# Patient Record
Sex: FEMALE | Race: WHITE | NOT HISPANIC OR LATINO | Employment: UNEMPLOYED | URBAN - METROPOLITAN AREA
[De-identification: names, ages, dates, MRNs, and addresses within clinical notes are randomized per-mention and may not be internally consistent; named-entity substitution may affect disease eponyms.]

---

## 2017-01-04 ENCOUNTER — TELEPHONE (OUTPATIENT)
Dept: OBSTETRICS AND GYNECOLOGY | Facility: CLINIC | Age: 47
End: 2017-01-04

## 2017-01-04 NOTE — LETTER
January 5, 2017    Fawn Waters  [address] [place] [address]             [facility]  [address] [facility]  5th Floor [facility], [address]  [place] [address]  Phone: [phone] Dear Ms. Waters:    The results of your most recent Pap smear are normal. This means that no cancerous or precancerous cells were seen. We recommend that you come back in 1 year for your annual exam and 3 years for your next Pap smear.    If you have any questions or concerns, please don't hesitate to call.    Sincerely,        Dr. John Mata

## 2017-01-23 ENCOUNTER — TELEPHONE (OUTPATIENT)
Dept: OBSTETRICS AND GYNECOLOGY | Facility: CLINIC | Age: 47
End: 2017-01-23

## 2017-01-23 NOTE — TELEPHONE ENCOUNTER
The detrol la is not working.  Is there any thing else she can take or should she go see another Doctor.  Please advise.

## 2017-01-23 NOTE — TELEPHONE ENCOUNTER
Recommend patient follow up with either Dr. Gillespie or the new female urology physician here in [place].

## 2017-01-23 NOTE — TELEPHONE ENCOUNTER
----- Message from Chuyita Hernandez sent at 1/23/2017 11:40 AM CST -----  Contact: [phone]  Pt would like a nurse call back in regards to the medication she was placed on /Pt states the medicine is not helping her. Please advise.

## 2017-01-24 NOTE — TELEPHONE ENCOUNTER
----- Message from Brittany Figueredo sent at 1/24/2017 10:22 AM CST -----  Contact: Self [phone]  Patient Returning Your Phone Call

## 2017-02-08 ENCOUNTER — HOSPITAL ENCOUNTER (EMERGENCY)
Facility: HOSPITAL | Age: 47
Discharge: HOME | End: 2017-02-08
Attending: EMERGENCY MEDICINE
Payer: MEDICAID

## 2017-02-08 VITALS
WEIGHT: 155 LBS | HEIGHT: 69 IN | BODY MASS INDEX: 22.96 KG/M2 | DIASTOLIC BLOOD PRESSURE: 60 MMHG | HEART RATE: 84 BPM | OXYGEN SATURATION: 100 % | RESPIRATION RATE: 18 BRPM | TEMPERATURE: 98 F | SYSTOLIC BLOOD PRESSURE: 131 MMHG

## 2017-02-08 DIAGNOSIS — R10.2 PELVIC PAIN: ICD-10-CM

## 2017-02-08 LAB
ALBUMIN SERPL BCP-MCNC: 4.1 G/DL
ALP SERPL-CCNC: 65 U/L
ALT SERPL W/O P-5'-P-CCNC: 13 U/L
ANION GAP SERPL CALC-SCNC: 9 MMOL/L
AST SERPL-CCNC: 18 U/L
B-HCG UR QL: NEGATIVE
BASOPHILS # BLD AUTO: 0.02 K/UL
BASOPHILS NFR BLD: 0.3 %
BILIRUB SERPL-MCNC: 0.3 MG/DL
BILIRUB UR QL STRIP: NEGATIVE
BUN SERPL-MCNC: 10 MG/DL
CALCIUM SERPL-MCNC: 9.4 MG/DL
CHLORIDE SERPL-SCNC: 106 MMOL/L
CLARITY UR: CLEAR
CO2 SERPL-SCNC: 21 MMOL/L
COLOR UR: YELLOW
CREAT SERPL-MCNC: 0.9 MG/DL
CTP QC/QA: YES
DIFFERENTIAL METHOD: ABNORMAL
EOSINOPHIL # BLD AUTO: 0.1 K/UL
EOSINOPHIL NFR BLD: 1.2 %
ERYTHROCYTE [DISTWIDTH] IN BLOOD BY AUTOMATED COUNT: 19.2 %
EST. GFR  (AFRICAN AMERICAN): >60 ML/MIN/1.73 M^2
EST. GFR  (NON AFRICAN AMERICAN): >60 ML/MIN/1.73 M^2
GLUCOSE SERPL-MCNC: 90 MG/DL
GLUCOSE UR QL STRIP: NEGATIVE
HCT VFR BLD AUTO: 27.9 %
HGB BLD-MCNC: 7.5 G/DL
HGB UR QL STRIP: NEGATIVE
KETONES UR QL STRIP: NEGATIVE
LEUKOCYTE ESTERASE UR QL STRIP: NEGATIVE
LYMPHOCYTES # BLD AUTO: 1.2 K/UL
LYMPHOCYTES NFR BLD: 15.8 %
MCH RBC QN AUTO: 18.7 PG
MCHC RBC AUTO-ENTMCNC: 26.9 %
MCV RBC AUTO: 69 FL
MONOCYTES # BLD AUTO: 0.3 K/UL
MONOCYTES NFR BLD: 4.2 %
NEUTROPHILS # BLD AUTO: 6.1 K/UL
NEUTROPHILS NFR BLD: 78.4 %
NITRITE UR QL STRIP: NEGATIVE
PH UR STRIP: 8 [PH] (ref 5–8)
PLATELET # BLD AUTO: 257 K/UL
PMV BLD AUTO: 8.9 FL
POTASSIUM SERPL-SCNC: 4.2 MMOL/L
PROT SERPL-MCNC: 7.5 G/DL
PROT UR QL STRIP: NEGATIVE
RBC # BLD AUTO: 4.02 M/UL
SODIUM SERPL-SCNC: 136 MMOL/L
SP GR UR STRIP: 1.01 (ref 1–1.03)
URN SPEC COLLECT METH UR: NORMAL
UROBILINOGEN UR STRIP-ACNC: NEGATIVE EU/DL
WBC # BLD AUTO: 7.8 K/UL

## 2017-02-08 PROCEDURE — 99284 EMERGENCY DEPT VISIT MOD MDM: CPT | Mod: 25

## 2017-02-08 PROCEDURE — 85025 COMPLETE CBC W/AUTO DIFF WBC: CPT

## 2017-02-08 PROCEDURE — 96374 THER/PROPH/DIAG INJ IV PUSH: CPT

## 2017-02-08 PROCEDURE — 81003 URINALYSIS AUTO W/O SCOPE: CPT

## 2017-02-08 PROCEDURE — 63600175 PHARM REV CODE 636 W HCPCS: Performed by: EMERGENCY MEDICINE

## 2017-02-08 PROCEDURE — 81025 URINE PREGNANCY TEST: CPT

## 2017-02-08 PROCEDURE — 80053 COMPREHEN METABOLIC PANEL: CPT

## 2017-02-08 RX ORDER — KETOROLAC TROMETHAMINE 30 MG/ML
30 INJECTION, SOLUTION INTRAMUSCULAR; INTRAVENOUS
Status: COMPLETED | OUTPATIENT
Start: 2017-02-08 | End: 2017-02-08

## 2017-02-08 RX ORDER — FLUCONAZOLE 100 MG/1
100 TABLET ORAL DAILY
COMMUNITY
End: 2017-06-07

## 2017-02-08 RX ORDER — NAPROXEN SODIUM 550 MG/1
550 TABLET ORAL 2 TIMES DAILY PRN
Qty: 15 TABLET | Refills: 0 | Status: SHIPPED | OUTPATIENT
Start: 2017-02-08 | End: 2017-06-07

## 2017-02-08 RX ADMIN — KETOROLAC TROMETHAMINE 30 MG: 30 INJECTION, SOLUTION INTRAMUSCULAR at 10:02

## 2017-02-08 NOTE — DISCHARGE INSTRUCTIONS
Pelvic Pain, Uncertain Cause    Pelvic pain is pain felt in the lowest part of the belly (abdomen) and between the hipbones. The pain may be acute. This means it occurred suddenly and recently. Or the pain may be chronic. This means it has occurred for 6 months or longer.  There are many possible causes of pelvic pain. The pain may be due to a problem in the female reproductive system (pictured here). Or, it may be due to a problem in the digestive, urinary, or musculoskeletal systems.  Based on your visit today, the exact cause of your pelvic pain is not certain. Your condition does not appear to be serious at this time. But it is important for you to keep watching for any new symptoms or worsening of your condition.  General care  Your healthcare provider may advise a number of ways to help manage your pain. These can include:  · Taking over-the-counter pain medicine. Stronger pain medicine may also be prescribed, if needed.  · Applying heat to the pelvic area. Use a heating pad or a hot pack. Taking a hot bath may also help.  · Getting plenty of rest.  · Making certain lifestyle changes. These can include practicing good posture and getting regular exercise. (Studies have shown that these changes help reduce pelvic pain in some women.)  · Seeing a physical therapist or pain specialist. These healthcare providers can discuss other ways to manage pain with you.  Follow-up care  Follow up with your healthcare provider, or as advised.   When to seek medical advice  Call your healthcare provider right away if any of the following occur:  · Fever of 100.4°F or higher, or as directed by your healthcare provider  · Pain worsens or you have sudden, severe pain or new pain  · Nausea, vomiting, sweating, or restlessness  · Dizziness or fainting  · Unusual vaginal discharge  · Abnormal vaginal bleeding (especially bleeding after menopause)  Date Last Reviewed: 6/11/2015  © 3852-3388 [employer]. [address] [place] [address]  [place] [address]. All rights reserved. This information is not intended as a substitute for professional medical care. Always follow your healthcare professional's instructions.

## 2017-02-08 NOTE — ED PROVIDER NOTES
Encounter Date: 2/8/2017       History     Chief Complaint   Patient presents with    Abdominal Pain     c/o generalized abdominal pain that started at about 0730 this morning. Pain is now to center of lower abdomen. Also c/o nausea and diarrhea today     Review of patient's allergies indicates:   Allergen Reactions    Codeine      Pt states she is in recovery and cannot take any narcotic medications     Patient is a 46 y.o. female presenting with the following complaint: abdominal pain. The history is provided by the patient.   Abdominal Pain   The current episode started 2 to 3 hours ago. The onset of the illness was gradual. The abdominal pain is located in the LLQ. The abdominal pain does not radiate. The severity of the abdominal pain is 10/10. The other symptoms of the illness include diarrhea. The other symptoms of the illness do not include fever, vomiting, dysuria or vaginal discharge.   Significant associated medical issues do not include inflammatory bowel disease.     Past Medical History   Diagnosis Date    Asthma     Depression      No past medical history pertinent negatives.  Past Surgical History   Procedure Laterality Date    Tubal ligation       Family History   Problem Relation Age of Onset    Breast cancer Mother      Social History   Substance Use Topics    Smoking status: Current Every Day Smoker     Types: Cigarettes, Vaping with nicotine    Smokeless tobacco: None    Alcohol use No     Review of Systems   Constitutional: Negative for fever.   Gastrointestinal: Positive for abdominal pain and diarrhea. Negative for vomiting.   Genitourinary: Negative for dysuria and vaginal discharge.   All other systems reviewed and are negative.      Physical Exam   Initial Vitals   BP Pulse Resp Temp SpO2   02/08/17 0909 02/08/17 0909 02/08/17 0909 02/08/17 0909 02/08/17 0909   123/58 83 18 98.1 °F (36.7 °C) 100 %     Physical Exam    Nursing note and vitals reviewed.  Constitutional: No distress.    HENT:   Head: Normocephalic and atraumatic.   Eyes: EOM are normal.   Neck: Normal range of motion. Neck supple.   Cardiovascular: Normal rate, regular rhythm and normal heart sounds.   Pulmonary/Chest: Breath sounds normal.   Abdominal: Soft.   Left lower quadrant and suprapubic tenderness without guarding or rebound.  Bowel sounds are normal.   Musculoskeletal: Normal range of motion.   Neurological: She is alert and oriented to person, place, and time.   Skin: Skin is warm and dry.   Psychiatric: Her behavior is normal. Thought content normal.         ED Course   Procedures  Labs Reviewed   CBC W/ AUTO DIFFERENTIAL - Abnormal; Notable for the following:        Result Value    Hemoglobin 7.5 (*)     Hematocrit 27.9 (*)     MCV 69 (*)     MCH 18.7 (*)     MCHC 26.9 (*)     RDW 19.2 (*)     MPV 8.9 (*)     Gran% 78.4 (*)     Lymph% 15.8 (*)     All other components within normal limits   URINALYSIS   COMPREHENSIVE METABOLIC PANEL     Imaging Results         US Pelvis Comp with Transvag NON-OB (xpd) (Final result) Result time:  02/08/17 11:49:16    Procedure changed from US Pelvis Limited Non OB        Final result by Christine Beckham MD (02/08/17 11:49:16)    Impression:      1.  Normal pelvic ultrasound                   Electronically signed by: CHRISTINE BECKHAM MD  Date:     02/08/17  Time:    11:49     Narrative:    Pelvic ultrasound complete with transvaginal, non-OB    Comparison: none    Results: Transabdominal followed by transvaginal ultrasound of the pelvis obtained.  The uterus  measures   7.5 x 3.9 x 3.4 cm .  The endometrium measures 9 mm, which is normal.   The right ovary measures 1.9 x 1.9 x 1.1 cm. Small follicles seen.    The left ovary measures 3.3 x 3.4 x 2.7 cm. Small follicles seen.  Normal flow is seen to the bilateral ovaries.  No free fluid.                      Medical Decision Making:   Clinical Tests:   Lab Tests: Ordered and Reviewed  The following lab test(s) were unremarkable:  CBC, CMP and Urinalysis  Radiological Study: Ordered and Reviewed                   ED Course     Clinical Impression:   The encounter diagnosis was Pelvic pain.          Reese Judge MD  02/08/17 6100

## 2017-02-08 NOTE — ED AVS SNAPSHOT
[facility] [address]  [place] [address]               Fawn Waters   2017  9:27 AM   ED    Description:  Female : 1970   Department:  [facility]           Your Care was Coordinated By:     Provider Role From To    Reese Judge MD Attending Provider 17 0939 --      Reason for Visit     Abdominal Pain           Diagnoses this Visit        Comments    Pelvic pain           ED Disposition     None           To Do List           Follow-up Information     Follow up with Alex Mora MD.    Specialty:  Family Medicine    Contact information:    [address]  [place] [address]  [phone]         These Medications        Disp Refills Start End    naproxen sodium (ANAPROX) 550 MG tablet 15 tablet 0 2017     Take 1 tablet (550 mg total) by mouth 2 (two) times daily as needed (pain). - Oral    Pharmacy: [id number] - [place] [address] [facility] Ph #: [phone]         [facility]     [facility] -  Assistance  Registered nurses in the [facility] provide clinical advisement, health education, appointment booking, and other advisory services.  Call for this free service at [phone].             Medications           Message regarding Medications     Verify the changes and/or additions to your medication regime listed below are the same as discussed with your clinician today.  If any of these changes or additions are incorrect, please notify your healthcare provider.        START taking these NEW medications        Refills    naproxen sodium (ANAPROX) 550 MG tablet 0    Sig: Take 1 tablet (550 mg total) by mouth 2 (two) times daily as needed (pain).    Class: Print    Route: Oral      These medications were administered today        Dose Freq    ketorolac injection 30 mg 30 mg ED 1 Time    Sig: Inject 30  "mg into the vein ED 1 Time.    Class: Normal    Route: Intravenous           Verify that the below list of medications is an accurate representation of the medications you are currently taking.  If none reported, the list may be blank. If incorrect, please contact your healthcare provider. Carry this list with you in case of emergency.           Current Medications     albuterol 90 mcg/actuation inhaler Inhale 1-2 puffs into the lungs every 6 (six) hours as needed for Wheezing or Shortness of Breath.    fluconazole (DIFLUCAN) 100 MG tablet Take 100 mg by mouth once daily.    fluoxetine (PROZAC) 20 MG capsule Take 1 capsule (20 mg total) by mouth once daily.    omeprazole (PRILOSEC) 40 MG capsule Take 1 capsule (40 mg total) by mouth 2 (two) times daily before meals.    tolterodine (DETROL LA) 4 MG 24 hr capsule Take 1 capsule (4 mg total) by mouth once daily.    naproxen sodium (ANAPROX) 550 MG tablet Take 1 tablet (550 mg total) by mouth 2 (two) times daily as needed (pain).           Clinical Reference Information           Your Vitals Were     BP Pulse Temp Resp Height Weight    123/58 (BP Location: Right arm, Patient Position: Sitting) 83 98.1 °F (36.7 °C) (Oral) 18 5' 9" (1.753 m) 70.3 kg (155 lb)    Last Period SpO2 BMI          01/08/2017 100% 22.89 kg/m2        Allergies as of 2/8/2017        Reactions    Codeine     Pt states she is in recovery and cannot take any narcotic medications      Immunizations Administered on Date of Encounter - 2/8/2017     None      ED Micro, Lab, POCT     Start Ordered       Status Ordering Provider    02/08/17 1021 02/08/17 1020  CBC auto differential  STAT      Final result     02/08/17 1021 02/08/17 1020  Comprehensive metabolic panel  STAT      Final result     02/08/17 1021 02/08/17 1020  Urinalysis  STAT      Final result     02/08/17 0000 02/08/17 0933  POCT urine pregnancy     Comments:  This order was created through External Result Entry    Completed       ED Imaging " Orders     Start Ordered       Status Ordering Provider    02/08/17 1059 02/08/17 1055  US Pelvis Comp with Transvag NON-OB (xpd)  1 time imaging      Final result     02/08/17 1056 02/08/17 1055    1 time imaging,   Status:  Canceled      Canceled         Discharge Instructions         Pelvic Pain, Uncertain Cause    Pelvic pain is pain felt in the lowest part of the belly (abdomen) and between the hipbones. The pain may be acute. This means it occurred suddenly and recently. Or the pain may be chronic. This means it has occurred for 6 months or longer.  There are many possible causes of pelvic pain. The pain may be due to a problem in the female reproductive system (pictured here). Or, it may be due to a problem in the digestive, urinary, or musculoskeletal systems.  Based on your visit today, the exact cause of your pelvic pain is not certain. Your condition does not appear to be serious at this time. But it is important for you to keep watching for any new symptoms or worsening of your condition.  General care  Your healthcare provider may advise a number of ways to help manage your pain. These can include:  · Taking over-the-counter pain medicine. Stronger pain medicine may also be prescribed, if needed.  · Applying heat to the pelvic area. Use a heating pad or a hot pack. Taking a hot bath may also help.  · Getting plenty of rest.  · Making certain lifestyle changes. These can include practicing good posture and getting regular exercise. (Studies have shown that these changes help reduce pelvic pain in some women.)  · Seeing a physical therapist or pain specialist. These healthcare providers can discuss other ways to manage pain with you.  Follow-up care  Follow up with your healthcare provider, or as advised.   When to seek medical advice  Call your healthcare provider right away if any of the following occur:  · Fever of 100.4°F or higher, or as directed by your healthcare provider  · Pain worsens or you  have sudden, severe pain or new pain  · Nausea, vomiting, sweating, or restlessness  · Dizziness or fainting  · Unusual vaginal discharge  · Abnormal vaginal bleeding (especially bleeding after menopause)  Date Last Reviewed: 6/11/2015  © 0888-6231 [employer]. [address] [place] [address], [place] [address]. All rights reserved. This information is not intended as a substitute for professional medical care. Always follow your healthcare professional's instructions.          [facility] Sign-Up     Activating your [facility] account is as easy as 1-2-3!     1) Visit [email or web address], select Sign Up Now, enter this activation code and your date of birth, then select Next.  [email or web address]  Expires: 3/25/2017 12:26 PM      2) Create a username and password to use when you visit [facility] in the future and select a security question in case you lose your password and select Next.    3) Enter your e-mail address and click Sign Up!    Additional Information  If you have questions, please e-mail [email or web address] or call [phone] to talk to our [facility] staff. Remember, [facility] is NOT to be used for urgent needs. For medical emergencies, dial 911.         Smoking Cessation     If you would like to quit smoking:   You may be eligible for free services if you are a [place] resident and started smoking cigarettes before September 1, 1988.  Call the Smoking Cessation Trust ([facility]) toll free at (710) [phone] or (808) 087-6167.   Call 7-732-QUIT-NOW if you do not meet the above criteria.             [facility] complies with applicable Federal civil rights laws and does not discriminate on the basis of race, color, national origin, age, disability, or sex.        Language Assistance Services     ATTENTION: Language assistance services are available, free of charge. Please call [phone].      ATENCIÓN: Si habla español, tiene a glover disposición servicios gratuitos de asistencia  lingüística. [facility] [phone].     NAJMA Ý: N?u b?n nói Ti?ng Vi?t, có các d?ch v? h? tr? ngôn ng? mi?n phí dành cho b?n. G?i s? [phone].

## 2017-06-07 ENCOUNTER — OFFICE VISIT (OUTPATIENT)
Dept: FAMILY MEDICINE | Facility: HOSPITAL | Age: 47
End: 2017-06-07
Attending: FAMILY MEDICINE
Payer: MEDICAID

## 2017-06-07 VITALS
SYSTOLIC BLOOD PRESSURE: 115 MMHG | BODY MASS INDEX: 23.8 KG/M2 | WEIGHT: 160.69 LBS | DIASTOLIC BLOOD PRESSURE: 63 MMHG | HEART RATE: 88 BPM | HEIGHT: 69 IN

## 2017-06-07 DIAGNOSIS — S99.921A INJURY OF TOE ON RIGHT FOOT, INITIAL ENCOUNTER: ICD-10-CM

## 2017-06-07 PROCEDURE — 99213 OFFICE O/P EST LOW 20 MIN: CPT | Performed by: FAMILY MEDICINE

## 2017-06-07 RX ORDER — FLUOXETINE HYDROCHLORIDE 20 MG/1
CAPSULE ORAL
COMMUNITY
Start: 2017-05-08

## 2017-06-07 RX ORDER — FLUTICASONE PROPIONATE 50 MCG
SPRAY, SUSPENSION (ML) NASAL
COMMUNITY
Start: 2017-05-14 | End: 2023-02-14

## 2017-06-07 RX ORDER — AZITHROMYCIN 250 MG/1
TABLET, FILM COATED ORAL
COMMUNITY
Start: 2017-05-14 | End: 2017-06-07

## 2017-06-07 RX ORDER — MECLIZINE HYDROCHLORIDE 25 MG/1
TABLET ORAL
COMMUNITY
Start: 2017-05-14 | End: 2017-06-07

## 2017-06-07 RX ORDER — OMEPRAZOLE 20 MG/1
CAPSULE, DELAYED RELEASE ORAL
COMMUNITY
Start: 2017-05-08

## 2017-06-08 ENCOUNTER — HOSPITAL ENCOUNTER (OUTPATIENT)
Dept: RADIOLOGY | Facility: HOSPITAL | Age: 47
Discharge: HOME | End: 2017-06-08
Attending: FAMILY MEDICINE
Payer: MEDICAID

## 2017-06-08 DIAGNOSIS — S99.921A INJURY OF TOE ON RIGHT FOOT, INITIAL ENCOUNTER: ICD-10-CM

## 2017-06-08 PROCEDURE — 73630 X-RAY EXAM OF FOOT: CPT | Mod: TC,RT

## 2017-06-08 PROCEDURE — 73630 X-RAY EXAM OF FOOT: CPT | Mod: 26,RT,, | Performed by: RADIOLOGY

## 2017-06-08 NOTE — PROGRESS NOTES
Subjective:       Patient ID: Fawn Waters is a 47 y.o. female.    Chief Complaint: Toe Injury    HPI   48 y/o female with PMH of depression and pre-diabetes here for toe injury. Patient injured her right 4th toe about 10 days ago when she stubbed her toe outside on her garden border. She states the 4th right toe was extremely deviated to the right at which point she immediately grabbed the toe and bent it back. She reports decreased pain after the maneuver. In general, she has been ambulating without pain. She has even continued her usual gym routine which includes running on the treadmill. She claims that it only hurts for the first 10 steps or so after which point the pain usually goes away. She denies any decreased ROM, numbness, tingling, or other parathesia. Currently not in pain.     Review of Systems   Constitutional: Negative for chills and fever.   HENT: Negative for congestion, rhinorrhea and sore throat.    Eyes: Negative for discharge and redness.   Respiratory: Negative for cough, shortness of breath, wheezing and stridor.    Cardiovascular: Negative for chest pain, palpitations and leg swelling.   Gastrointestinal: Negative for abdominal distention, abdominal pain, blood in stool, constipation, diarrhea, nausea and vomiting.   Genitourinary: Negative for difficulty urinating, dysuria and hematuria.   Musculoskeletal: Positive for joint swelling (right 4th toe). Negative for back pain and neck pain.   Skin: Negative for rash.   Neurological: Negative for dizziness, light-headedness and headaches.   Psychiatric/Behavioral: Negative for agitation, behavioral problems and confusion.   All other systems reviewed and are negative.      Objective:      Vitals:    06/07/17 1627   BP: 115/63   Pulse: 88     Physical Exam   Constitutional: She is oriented to person, place, and time. She appears well-developed and well-nourished. No distress.   HENT:   Head: Normocephalic and atraumatic.   Eyes:  Conjunctivae and EOM are normal. Pupils are equal, round, and reactive to light. Right eye exhibits no discharge. Left eye exhibits no discharge. No scleral icterus.   Neck: Normal range of motion. Neck supple. No tracheal deviation present. No thyromegaly present.   Cardiovascular: Normal rate, regular rhythm, normal heart sounds and intact distal pulses.  Exam reveals no gallop and no friction rub.    No murmur heard.  Pulmonary/Chest: Effort normal and breath sounds normal. No respiratory distress. She has no wheezes. She has no rales. She exhibits no tenderness.   Abdominal: Soft. Bowel sounds are normal. She exhibits no distension and no mass. There is no tenderness.   Musculoskeletal: Normal range of motion. She exhibits edema and tenderness.   Right 4th toe with minimal edema to interphalangeal joint. No erythema or ecchymosis.    Lymphadenopathy:     She has no cervical adenopathy.   Neurological: She is alert and oriented to person, place, and time.   Skin: Skin is warm. No rash noted. She is not diaphoretic. No erythema.   Psychiatric: She has a normal mood and affect. Her behavior is normal. Judgment and thought content normal.   Nursing note and vitals reviewed.      Assessment:       1. Injury of toe on right foot, initial encounter        Plan:       Injury of toe on right foot, initial encounter  -     X-Ray Foot Complete 3 view Right; Future; Expected date: 06/07/2017    Pt was interviewed and examined. Concern for tendon damage given mechanism of injury. Recommend xray to further evaluate. Patient advised continued RICE therapy. She agreed to the plan . Will call patient with any concerning findings on imaging.   Return if symptoms worsen or fail to improve.   6/7/2017 Paramjit Pennington M.D.  [place] [facility] Resident PGY-1

## 2017-06-08 NOTE — PROGRESS NOTES
I assume primary medical responsibility for this patient, I have reviewed the case history, findings, diagnosis and treatment plan with the resident and agree that the care is reasonable and necessary. This service has been performed by a resident without the presence of a teaching physician under the primary care exception  Oralia Alatorre  6/8/2017

## 2017-06-16 ENCOUNTER — TELEPHONE (OUTPATIENT)
Dept: FAMILY MEDICINE | Facility: HOSPITAL | Age: 47
End: 2017-06-16

## 2017-06-16 NOTE — TELEPHONE ENCOUNTER
----- Message from Geetha Arteaga sent at 6/12/2017  3:48 PM CDT -----  Pt she requesting her x rays result please call pt asap.

## 2017-07-13 ENCOUNTER — OFFICE VISIT (OUTPATIENT)
Dept: FAMILY MEDICINE | Facility: HOSPITAL | Age: 47
End: 2017-07-13
Attending: FAMILY MEDICINE
Payer: MEDICAID

## 2017-07-13 VITALS
HEIGHT: 69 IN | SYSTOLIC BLOOD PRESSURE: 132 MMHG | WEIGHT: 164.69 LBS | DIASTOLIC BLOOD PRESSURE: 77 MMHG | BODY MASS INDEX: 24.39 KG/M2 | HEART RATE: 88 BPM

## 2017-07-13 DIAGNOSIS — S99.921D INJURY OF TOE ON RIGHT FOOT, SUBSEQUENT ENCOUNTER: Primary | ICD-10-CM

## 2017-07-13 PROCEDURE — 99213 OFFICE O/P EST LOW 20 MIN: CPT | Performed by: FAMILY MEDICINE

## 2017-07-13 RX ORDER — IBUPROFEN 800 MG/1
800 TABLET ORAL 3 TIMES DAILY
COMMUNITY
End: 2018-03-01 | Stop reason: SDUPTHER

## 2017-07-13 NOTE — PROGRESS NOTES
"Subjective:       Patient ID: Fawn Waters is a 47 y.o. female.    Chief Complaint: Results    HPI   48 y/o F with PMH of depression here for f/u of right 4th toe injury and xray results. Patient reports toe pain has improved ans she is still able to complete all of here ADL's. She is still running on the treadmill at the gym. She occasionally has pain in the toe at night when she gets up to use the bathroom. She also reports a small, non-tender, "bump" under her right 4th toe that has persisted despite NSAID's and significant improvement in her overall pain level. She did not splint the toe as instructed.     Review of Systems   Constitutional: Negative for chills and fever.   HENT: Negative for congestion, rhinorrhea and sore throat.    Eyes: Negative for discharge and redness.   Respiratory: Negative for cough, shortness of breath, wheezing and stridor.    Cardiovascular: Negative for chest pain, palpitations and leg swelling.   Gastrointestinal: Negative for abdominal distention, abdominal pain, blood in stool, constipation, diarrhea, nausea and vomiting.   Genitourinary: Negative for difficulty urinating, dysuria and hematuria.   Musculoskeletal: Negative for back pain and neck pain.   Skin: Negative for rash.   Neurological: Negative for dizziness, light-headedness and headaches.   Psychiatric/Behavioral: Negative for agitation, behavioral problems and confusion.   All other systems reviewed and are negative.      Objective:      Vitals:    07/13/17 0947   BP: 132/77   Pulse: 88     Physical Exam   Constitutional: She is oriented to person, place, and time. She appears well-developed and well-nourished. No distress.   HENT:   Head: Normocephalic and atraumatic.   Eyes: Conjunctivae and EOM are normal. Pupils are equal, round, and reactive to light. Right eye exhibits no discharge. Left eye exhibits no discharge. No scleral icterus.   Neck: Normal range of motion. Neck supple. No tracheal deviation " present. No thyromegaly present.   Cardiovascular: Normal rate, regular rhythm, normal heart sounds and intact distal pulses.  Exam reveals no gallop and no friction rub.    No murmur heard.  Pulmonary/Chest: Effort normal and breath sounds normal. No respiratory distress. She has no wheezes. She has no rales. She exhibits no tenderness.   Abdominal: Soft. Bowel sounds are normal. She exhibits no distension and no mass. There is no tenderness.   Musculoskeletal: Normal range of motion. She exhibits edema (Right 4th toe with minimal edema to interphalangeal joint. No erythema or ecchymosis. Essentially unchanged from previous exam 1 month ago. ). She exhibits no tenderness.   Lymphadenopathy:     She has no cervical adenopathy.   Neurological: She is alert and oriented to person, place, and time.   Skin: Skin is warm. No rash noted. She is not diaphoretic. No erythema.   Psychiatric: She has a normal mood and affect. Her behavior is normal. Judgment and thought content normal.   Nursing note and vitals reviewed.      Assessment:       1. Injury of toe on right foot, subsequent encounter        Plan:       Injury of toe on right foot, subsequent encounter    Patient reports minimal pain. No change to ADL's. Xray reveals no acute process. Advised continued RICE therapy to include splinting toe by taping to the 5th toe (general support measure, no fracture present). NSAID's okay for 1 more week, but otherwise recommended against long-term use. No need to limit activity as patient is already exercising strenuously without issue.   Return if symptoms worsen or fail to improve.    7/13/2017 Paramjit Pennington M.D.  [facility] Resident PGY-2

## 2017-07-20 NOTE — PROGRESS NOTES
I was present in clinic when the patient was seen and I discussed the case with Dr. Pennington.  I have reviewed and agree with the assessment and plan.

## 2017-10-17 ENCOUNTER — OFFICE VISIT (OUTPATIENT)
Dept: URGENT CARE | Facility: CLINIC | Age: 47
End: 2017-10-17
Payer: MEDICAID

## 2017-10-17 VITALS
HEIGHT: 69 IN | OXYGEN SATURATION: 98 % | HEART RATE: 91 BPM | DIASTOLIC BLOOD PRESSURE: 80 MMHG | RESPIRATION RATE: 18 BRPM | SYSTOLIC BLOOD PRESSURE: 145 MMHG | TEMPERATURE: 98 F | WEIGHT: 165 LBS | BODY MASS INDEX: 24.44 KG/M2

## 2017-10-17 DIAGNOSIS — J30.9 ALLERGIC RHINITIS, UNSPECIFIED CHRONICITY, UNSPECIFIED SEASONALITY, UNSPECIFIED TRIGGER: Primary | ICD-10-CM

## 2017-10-17 PROCEDURE — 99214 OFFICE O/P EST MOD 30 MIN: CPT | Mod: S$GLB,,, | Performed by: PHYSICIAN ASSISTANT

## 2017-10-17 NOTE — PROGRESS NOTES
"Subjective:       Patient ID: Fawn Waters is a 47 y.o. female.    Vitals:  height is 5' 9" (1.753 m) and weight is 74.8 kg (165 lb). Her oral temperature is 98.1 °F (36.7 °C). Her blood pressure is 145/80 (abnormal) and her pulse is 91. Her respiration is 18 and oxygen saturation is 98%.     Chief Complaint: Sinus Problem    This is a 47 y.o. female with Past Medical History:  No date: Asthma  No date: Depression   who presents today with a chief complaint of sinus pressure with blood tinged sputum for 1 month. She took a Z pack 3 weeks ago. 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      Sinus Problem   This is a recurrent problem. The current episode started 1 to 4 weeks ago. The problem has been gradually worsening since onset. There has been no fever. Her pain is at a severity of 0/10. She is experiencing no pain. Associated symptoms include congestion, coughing, ear pain, headaches and sinus pressure. Pertinent negatives include no chills, hoarse voice, shortness of breath, sore throat or swollen glands. Past treatments include spray decongestants. The treatment provided mild relief.     Review of Systems   Constitution: Negative for chills, fever and malaise/fatigue.   HENT: Positive for congestion, ear pain and sinus pressure. Negative for hoarse voice and sore throat.    Eyes: Negative for discharge and redness.   Cardiovascular: Negative for chest pain, dyspnea on exertion and leg swelling.   Respiratory: Positive for cough and sputum production. Negative for hemoptysis, shortness of breath and wheezing.    Musculoskeletal: Negative for myalgias.   Gastrointestinal: Negative for abdominal pain and nausea.   Neurological: Positive for headaches.       Objective:      Physical Exam   Constitutional: She is oriented to person, place, and time. She appears well-developed and well-nourished.   HENT:   Head: Normocephalic and atraumatic.   Right Ear: Hearing, tympanic membrane, external ear and ear canal normal. "   Left Ear: Hearing, tympanic membrane, external ear and ear canal normal.   Nose: Nose normal.   Mouth/Throat: Uvula is midline and oropharynx is clear and moist.   Eyes: Conjunctivae are normal.   Neck: Normal range of motion. Neck supple. No thyromegaly present.   Cardiovascular: Normal rate and regular rhythm.  Exam reveals no gallop and no friction rub.    No murmur heard.  Pulmonary/Chest: Effort normal and breath sounds normal. She has no wheezes. She has no rales.   Musculoskeletal: Normal range of motion.   Lymphadenopathy:     She has no cervical adenopathy.   Neurological: She is alert and oriented to person, place, and time.   Skin: Skin is warm and dry. No rash noted. No erythema.   Psychiatric: She has a normal mood and affect. Her behavior is normal. Judgment and thought content normal.   Nursing note and vitals reviewed.      Assessment:       1. Allergic rhinitis, unspecified chronicity, unspecified seasonality, unspecified trigger        Plan:         Allergic rhinitis, unspecified chronicity, unspecified seasonality, unspecified trigger      Fawn HEAD was seen today for sinus problem.    Diagnoses and all orders for this visit:    Allergic rhinitis, unspecified chronicity, unspecified seasonality, unspecified trigger      Patient Instructions   - Rest.    - Drink plenty of fluids.    - Tylenol or Ibuprofen as directed as needed for fever/pain.    - Take over-the-counter claritin, zyrtec, allegra, or xyzal as directed.  - Use over the counter Flonase as directed for sinus congestion and postnasal drip.   - Follow up with your PCP or specialty clinic as directed in the next 1-2 weeks if not improved or as needed.  You can call [phone] to schedule an appointment with the appropriate provider.    - Go to the ED if your symptoms worsen.    Allergic Rhinitis  Allergic rhinitis is an allergic reaction that affects the nose, and often the eyes. Its often known as nasal allergies. Nasal allergies  are often due to things in the environment that are breathed in. Depending what you are sensitive to, nasal allergies may occur only during certain seasons. Or they may occur year round. Common indoor allergens include house dust mites, mold, cockroaches, and pet dander. Outdoor allergens include pollen from trees, grasses, and weeds.   Symptoms include a drippy, stuffy, and itchy nose. They also include sneezing and red and itchy eyes. You may feel tired more often. Severe allergies may also affect your breathing and trigger a condition called asthma.   Tests can be done to see what allergens are affecting you. You may be referred to an allergy specialist for testing and further evaluation.  Home care  Your healthcare provider may prescribe medicines to help relieve allergy symptoms. These may include oral medicines, nasal sprays, or eye drops.  Ask your provider for advice on how to avoid substances that you are allergic to. Below are a few tips for each type of allergen.  Pet dander:  · Do not have pets with fur and feathers.  · If you can't avoid having a pet, keep it out of your bedroom and off upholstered furniture.  Pollen:  · When pollen counts are high, keep windows of your car and home closed. If possible, use an air conditioner instead.  · Wear a filter mask when mowing or doing yard work.  House dust mites:  · Wash bedding every week in warm water and detergent and dry on a hot setting.  · Cover the mattress, box spring, and pillows with allergy covers.   · If possible, sleep in a room with no carpet, curtains, or upholstered furniture.  Cockroaches:  · Store food in sealed containers.  · Remove garbage from the home promptly.  · Fix water leaks  Mold:  · Keep humidity low by using a dehumidifier or air conditioner. Keep the dehumidifier and air conditioner clean and free of mold.  · Clean moldy areas with bleach and water.  In general:  · Vacuum once or twice a week. If possible, use a vacuum with a  high-efficiency particulate air (HEPA) filter.  · Do not smoke. Avoid cigarette smoke. Cigarette smoke is an irritant that can make symptoms worse.  Follow-up care  Follow up as advised by the healthcare provider or our staff. If you were referred to an allergy specialist, make this appointment promptly.  When to seek medical advice  Call your healthcare provider right away if the following occur:  · Coughing or wheezing  · Fever greater than 100.4°F (38°C)  · Hives (raised red bumps)  · Continuing symptoms, new symptoms, or worsening symptoms  Call 911 right away if you have:  · Trouble breathing  · Severe swelling of the face or severe itching of the eyes or mouth  Date Last Reviewed: 3/1/2017  © 0273-2918 [employer]. [address] [place] [address], [place] [address]. All rights reserved. This information is not intended as a substitute for professional medical care. Always follow your healthcare professional's instructions.

## 2017-10-17 NOTE — PATIENT INSTRUCTIONS
- Rest.    - Drink plenty of fluids.    - Tylenol or Ibuprofen as directed as needed for fever/pain.    - Take over-the-counter claritin, zyrtec, allegra, or xyzal as directed.  - Use over the counter Flonase as directed for sinus congestion and postnasal drip.   - Follow up with your PCP or specialty clinic as directed in the next 1-2 weeks if not improved or as needed.  You can call [phone] to schedule an appointment with the appropriate provider.    - Go to the ED if your symptoms worsen.    Allergic Rhinitis  Allergic rhinitis is an allergic reaction that affects the nose, and often the eyes. Its often known as nasal allergies. Nasal allergies are often due to things in the environment that are breathed in. Depending what you are sensitive to, nasal allergies may occur only during certain seasons. Or they may occur year round. Common indoor allergens include house dust mites, mold, cockroaches, and pet dander. Outdoor allergens include pollen from trees, grasses, and weeds.   Symptoms include a drippy, stuffy, and itchy nose. They also include sneezing and red and itchy eyes. You may feel tired more often. Severe allergies may also affect your breathing and trigger a condition called asthma.   Tests can be done to see what allergens are affecting you. You may be referred to an allergy specialist for testing and further evaluation.  Home care  Your healthcare provider may prescribe medicines to help relieve allergy symptoms. These may include oral medicines, nasal sprays, or eye drops.  Ask your provider for advice on how to avoid substances that you are allergic to. Below are a few tips for each type of allergen.  Pet dander:  · Do not have pets with fur and feathers.  · If you can't avoid having a pet, keep it out of your bedroom and off upholstered furniture.  Pollen:  · When pollen counts are high, keep windows of your car and home closed. If possible, use an air conditioner instead.  · Wear a filter  mask when mowing or doing yard work.  House dust mites:  · Wash bedding every week in warm water and detergent and dry on a hot setting.  · Cover the mattress, box spring, and pillows with allergy covers.   · If possible, sleep in a room with no carpet, curtains, or upholstered furniture.  Cockroaches:  · Store food in sealed containers.  · Remove garbage from the home promptly.  · Fix water leaks  Mold:  · Keep humidity low by using a dehumidifier or air conditioner. Keep the dehumidifier and air conditioner clean and free of mold.  · Clean moldy areas with bleach and water.  In general:  · Vacuum once or twice a week. If possible, use a vacuum with a high-efficiency particulate air (HEPA) filter.  · Do not smoke. Avoid cigarette smoke. Cigarette smoke is an irritant that can make symptoms worse.  Follow-up care  Follow up as advised by the healthcare provider or our staff. If you were referred to an allergy specialist, make this appointment promptly.  When to seek medical advice  Call your healthcare provider right away if the following occur:  · Coughing or wheezing  · Fever greater than 100.4°F (38°C)  · Hives (raised red bumps)  · Continuing symptoms, new symptoms, or worsening symptoms  Call 911 right away if you have:  · Trouble breathing  · Severe swelling of the face or severe itching of the eyes or mouth  Date Last Reviewed: 3/1/2017  © 3841-1367 [employer]. [address] [place] [address]. All rights reserved. This information is not intended as a substitute for professional medical care. Always follow your healthcare professional's instructions.

## 2018-03-01 ENCOUNTER — HOSPITAL ENCOUNTER (EMERGENCY)
Facility: HOSPITAL | Age: 48
Discharge: HOME | End: 2018-03-01
Attending: EMERGENCY MEDICINE
Payer: MEDICAID

## 2018-03-01 VITALS
WEIGHT: 170 LBS | RESPIRATION RATE: 16 BRPM | SYSTOLIC BLOOD PRESSURE: 123 MMHG | OXYGEN SATURATION: 100 % | DIASTOLIC BLOOD PRESSURE: 66 MMHG | TEMPERATURE: 97 F | HEART RATE: 88 BPM | HEIGHT: 69 IN | BODY MASS INDEX: 25.18 KG/M2

## 2018-03-01 DIAGNOSIS — R52 PAIN: ICD-10-CM

## 2018-03-01 DIAGNOSIS — S93.102A: Primary | ICD-10-CM

## 2018-03-01 PROCEDURE — 90715 TDAP VACCINE 7 YRS/> IM: CPT | Performed by: PHYSICIAN ASSISTANT

## 2018-03-01 PROCEDURE — 99284 EMERGENCY DEPT VISIT MOD MDM: CPT

## 2018-03-01 PROCEDURE — 25000003 PHARM REV CODE 250: Performed by: PHYSICIAN ASSISTANT

## 2018-03-01 PROCEDURE — 63600175 PHARM REV CODE 636 W HCPCS: Performed by: PHYSICIAN ASSISTANT

## 2018-03-01 PROCEDURE — 90471 IMMUNIZATION ADMIN: CPT | Performed by: PHYSICIAN ASSISTANT

## 2018-03-01 RX ORDER — MUPIROCIN 20 MG/G
1 OINTMENT TOPICAL
Status: DISCONTINUED | OUTPATIENT
Start: 2018-03-01 | End: 2018-03-01 | Stop reason: HOSPADM

## 2018-03-01 RX ORDER — IBUPROFEN 400 MG/1
800 TABLET ORAL
Status: COMPLETED | OUTPATIENT
Start: 2018-03-01 | End: 2018-03-01

## 2018-03-01 RX ORDER — IBUPROFEN 800 MG/1
800 TABLET ORAL EVERY 6 HOURS PRN
Qty: 20 TABLET | Refills: 0 | Status: SHIPPED | OUTPATIENT
Start: 2018-03-01 | End: 2023-02-14

## 2018-03-01 RX ADMIN — IBUPROFEN 800 MG: 400 TABLET, FILM COATED ORAL at 03:03

## 2018-03-01 RX ADMIN — CLOSTRIDIUM TETANI TOXOID ANTIGEN (FORMALDEHYDE INACTIVATED), CORYNEBACTERIUM DIPHTHERIAE TOXOID ANTIGEN (FORMALDEHYDE INACTIVATED), BORDETELLA PERTUSSIS TOXOID ANTIGEN (GLUTARALDEHYDE INACTIVATED), BORDETELLA PERTUSSIS FILAMENTOUS HEMAGGLUTININ ANTIGEN (FORMALDEHYDE INACTIVATED), BORDETELLA PERTUSSIS PERTACTIN ANTIGEN, AND BORDETELLA PERTUSSIS FIMBRIAE 2/3 ANTIGEN 0.5 ML: 5; 2; 2.5; 5; 3; 5 INJECTION, SUSPENSION INTRAMUSCULAR at 03:03

## 2018-03-01 NOTE — DISCHARGE INSTRUCTIONS
Tape your 4th toe to your 3rd toe for the next 2 weeks.  Take ibuprofen as needed for pain and follow up with podiatrist next week.  Return with any new or worsening symptoms.

## 2018-03-01 NOTE — ED PROVIDER NOTES
Encounter Date: 3/1/2018       History     Chief Complaint   Patient presents with    Foot Injury     injury to left 4th toe at 1 this morning     Fawn Waters, a 47 y.o. female that presents to the ED for evaluation of left 4th toe pain.  Patient states that she struck this area against a piece of furniture around 1 AM this morning resulting in some excruciating pain to the site and some minor bleeding.  She initially wrapped the toe and was able to fall back asleep.  She states that the area has continued with pain.  She states that she had dislocated this toe previously.  No history of any surgeries to this foot.  He was tried included administration of ibuprofen with improvement of her pain.        The history is provided by the patient.     Review of patient's allergies indicates:   Allergen Reactions    Codeine      Pt states she is in recovery and cannot take any narcotic medications     Past Medical History:   Diagnosis Date    Asthma     Depression      Past Surgical History:   Procedure Laterality Date    TUBAL LIGATION       Family History   Problem Relation Age of Onset    Breast cancer Mother      Social History   Substance Use Topics    Smoking status: Former Smoker     Types: Vaping with nicotine     Quit date: 1/31/2017    Smokeless tobacco: Never Used    Alcohol use No     Review of Systems   Constitutional: Negative for fever.   Musculoskeletal: Positive for arthralgias (left toe pain ).   Skin: Positive for wound. Negative for color change.   Neurological: Negative for weakness and numbness.   Psychiatric/Behavioral: Negative for agitation and confusion.   All other systems reviewed and are negative.      Physical Exam     Initial Vitals [03/01/18 1345]   BP Pulse Resp Temp SpO2   129/70 100 16 97.3 °F (36.3 °C) 97 %      MAP       89.67         Physical Exam    Nursing note and vitals reviewed.  Constitutional: She appears well-developed and well-nourished. She is not diaphoretic.  No distress.   HENT:   Head: Normocephalic and atraumatic.   Right Ear: External ear normal.   Left Ear: External ear normal.   Nose: Nose normal.   Mouth/Throat: Oropharynx is clear and moist.   Eyes: Conjunctivae and EOM are normal. Right eye exhibits no discharge. Left eye exhibits no discharge. No scleral icterus.   Neck: Normal range of motion. No tracheal deviation present.   Cardiovascular: Normal rate, regular rhythm and normal heart sounds. Exam reveals no gallop and no friction rub.    No murmur heard.  Pulmonary/Chest: Breath sounds normal. She has no rhonchi.   Musculoskeletal:        Left ankle: Normal.        Left foot: There is decreased range of motion, tenderness and bony tenderness. There is no swelling and normal capillary refill.        Feet:    Neurological: She is alert and oriented to person, place, and time.   Skin: Skin is warm and dry. Capillary refill takes less than 2 seconds. No rash noted. No erythema.   Psychiatric: She has a normal mood and affect. Thought content normal.         ED Course   Procedures  Labs Reviewed - No data to display     Imaging Results          X-Ray Foot Complete Left (Final result)  Result time 03/01/18 15:43:47    Final result by Blayne Lainez DO (03/01/18 15:43:47)                 Impression:     See Above      Electronically signed by: BLAYNE LAINEZ DO  Date:     03/01/18  Time:    15:43              Narrative:    Technique: AP, lateral and oblique views of the left foot     Comparison: None     Finding:  There is no evidence for acute fracture line of the left foot. There is no focal bone erosion. There is slight lateral subluxation of the fourth middle phalanx at the PIP joint. No evidence for dislocation. Clinical correlation advised.                                 Medical Decision Making:   Initial Assessment:   Pain to 4th left toe  Differential Diagnosis:   Fracture, dislocation, abrasion  Clinical Tests:   Radiological Study: Ordered and  Reviewed  ED Management:  Patient presents to ED with complaint of pain to left fourth toe after striking it against furniture.  She's got a mild abrasion noted to the medial aspect of the toe.  X-ray shows slight lateral subluxation of the fourth middle phalanx at the PIP joint.     4:13 PM - message left for Dr. Diamond to advise on treatment.      4:44 PM - Dr. Diamond consulted and stated that he felt like this may be an old injury, which is consistent with patient's history.  He suggested any taping fourth toe tip third toe, instruct the patient to change the tape daily, to apply topical antibiotic ointment to the abrasion and to follow-up with podiatry in 1-2 weeks.              Attending Attestation:     Physician Attestation Statement for NP/PA:   I have conducted a face to face encounter with this patient in addition to the NP/PA, due to NP/PA Request    Other NP/PA Attestation Additions:    History of Present Illness: Agree; 46 y/o F presents to the ED c/o left 4th toe pain since striking a piece of furniture by accident. Reports a constant aching pain worse with ambulation, without alleviating factors. No other injury reported   Physical Exam: Agree   Medical Decision Making: Agree; Spoke with Dr. Diamond, who recommends silvino tape and outpx f/u; Informed px of plan, instructed on symptomatic mgmt, reasons to return to the ED. Px expressed good understanding and is comfortable with discharge at this time.                     Clinical Impression:   The primary encounter diagnosis was Subluxation of left toe, initial encounter. A diagnosis of Pain was also pertinent to this visit.                           TRACEY Pillai-C  03/01/18 4953       Paul Goff MD  03/02/18 2224

## 2018-03-20 ENCOUNTER — HOSPITAL ENCOUNTER (EMERGENCY)
Facility: HOSPITAL | Age: 48
Discharge: HOME | End: 2018-03-20
Attending: EMERGENCY MEDICINE
Payer: MEDICAID

## 2018-03-20 VITALS
RESPIRATION RATE: 16 BRPM | HEIGHT: 69 IN | SYSTOLIC BLOOD PRESSURE: 137 MMHG | BODY MASS INDEX: 25.92 KG/M2 | DIASTOLIC BLOOD PRESSURE: 65 MMHG | OXYGEN SATURATION: 100 % | HEART RATE: 86 BPM | WEIGHT: 175 LBS | TEMPERATURE: 98 F

## 2018-03-20 DIAGNOSIS — L25.9 CONTACT DERMATITIS, UNSPECIFIED CONTACT DERMATITIS TYPE, UNSPECIFIED TRIGGER: Primary | ICD-10-CM

## 2018-03-20 PROCEDURE — 25000003 PHARM REV CODE 250: Performed by: PHYSICIAN ASSISTANT

## 2018-03-20 PROCEDURE — 96372 THER/PROPH/DIAG INJ SC/IM: CPT

## 2018-03-20 PROCEDURE — 99283 EMERGENCY DEPT VISIT LOW MDM: CPT | Mod: 25

## 2018-03-20 PROCEDURE — 63600175 PHARM REV CODE 636 W HCPCS: Performed by: PHYSICIAN ASSISTANT

## 2018-03-20 RX ORDER — METHYLPREDNISOLONE 4 MG/1
TABLET ORAL
Qty: 1 PACKAGE | Refills: 0 | Status: SHIPPED | OUTPATIENT
Start: 2018-03-20 | End: 2018-04-10

## 2018-03-20 RX ORDER — FAMOTIDINE 20 MG/1
20 TABLET, FILM COATED ORAL
Status: COMPLETED | OUTPATIENT
Start: 2018-03-20 | End: 2018-03-20

## 2018-03-20 RX ORDER — DEXAMETHASONE SODIUM PHOSPHATE 4 MG/ML
8 INJECTION, SOLUTION INTRA-ARTICULAR; INTRALESIONAL; INTRAMUSCULAR; INTRAVENOUS; SOFT TISSUE
Status: COMPLETED | OUTPATIENT
Start: 2018-03-20 | End: 2018-03-20

## 2018-03-20 RX ADMIN — FAMOTIDINE 20 MG: 20 TABLET, FILM COATED ORAL at 12:03

## 2018-03-20 RX ADMIN — DEXAMETHASONE SODIUM PHOSPHATE 8 MG: 4 INJECTION, SOLUTION INTRAMUSCULAR; INTRAVENOUS at 12:03

## 2018-03-20 NOTE — ED NOTES
Itchy, red rash that began on back Saturday and has improved with benadryl but returned when benadryl and allegra wears off.  Denies SOB.  Speech clear, no swelling to lips, tongue or oral cavity.  Denies any new perfumes, dyes or soaps.

## 2018-03-20 NOTE — ED PROVIDER NOTES
Encounter Date: 3/20/2018       History     Chief Complaint   Patient presents with    Rash     Pt states rash started on Saturday, complains of severe itching. Took Benadryl and Allegra this morning. Was here last night but wait was too long so went home and took Benadryl. Denies shortness of breath. Pt denies any new creams, soaps.      Fawn Waters, a 47 y.o. female that presents to the ED for non-specific rash that has waxed and waned since Saturday.  Patient states that she has taken Benadryl and allegra with initially improvement then subsequent return.  Patient states the rash started on her back and it spread to all extremities at different times.  She denies any shortness of breath.  Denies any changes in soaps, laundry detergent, perfume, medication.  She said that she took a pre-workout powder about a week ago and is only thing that she can think of that was a change to her normal routine.  She has no difficulty breathing, no sensation of close through or shortness of breath.        The history is provided by the patient.     Review of patient's allergies indicates:   Allergen Reactions    Codeine      Pt states she is in recovery and cannot take any narcotic medications     Past Medical History:   Diagnosis Date    Asthma     Depression      Past Surgical History:   Procedure Laterality Date    TUBAL LIGATION       Family History   Problem Relation Age of Onset    Breast cancer Mother      Social History   Substance Use Topics    Smoking status: Former Smoker     Types: Vaping with nicotine     Quit date: 1/31/2017    Smokeless tobacco: Never Used    Alcohol use No     Review of Systems   Constitutional: Negative for fever.   HENT: Negative for trouble swallowing.    Respiratory: Negative for shortness of breath.    Cardiovascular: Negative for chest pain.   Gastrointestinal: Negative for nausea and vomiting.   Skin: Positive for rash.   All other systems reviewed and are  negative.      Physical Exam     Initial Vitals [03/20/18 1130]   BP Pulse Resp Temp SpO2   132/73 99 18 97.8 °F (36.6 °C) 97 %      MAP       92.67         Physical Exam    Nursing note and vitals reviewed.  Constitutional: She appears well-developed and well-nourished. She is not diaphoretic. No distress.   HENT:   Head: Normocephalic and atraumatic.   Right Ear: External ear normal.   Left Ear: External ear normal.   Nose: Nose normal.   Mouth/Throat: Uvula is midline, oropharynx is clear and moist and mucous membranes are normal.   Eyes: Conjunctivae and EOM are normal. Right eye exhibits no discharge. Left eye exhibits no discharge. No scleral icterus.   Neck: Normal range of motion. Neck supple.   Cardiovascular: Normal rate, regular rhythm and normal heart sounds. Exam reveals no gallop and no friction rub.    No murmur heard.  Pulmonary/Chest: Breath sounds normal. No respiratory distress. She has no decreased breath sounds. She has no wheezes. She has no rhonchi. She has no rales. She exhibits no tenderness.   Musculoskeletal: Normal range of motion.   Neurological: She is alert and oriented to person, place, and time.   Skin: Skin is warm and dry. Rash noted. Rash is macular. No erythema.   Nonspecific erythematous, macular rash noted to stomach, back and lower legs.   Psychiatric: She has a normal mood and affect. Thought content normal.         ED Course   Procedures  Labs Reviewed - No data to display          Medical Decision Making:   Initial Assessment:   Waxing and waning rash since Saturday  Differential Diagnosis:   Contact dermatitis, eczema, urticaria  ED Management:  H presents to ED and NAD, afebrile and nontoxic.  There is a nonspecific macular rash to her stomach back and lower legs that is pruritic in nature.  Patient states that she'll taken Benadryl today.  Pepcid and Decadron administered in the emergency room.  Patient will be given a course of steroids and instructed to take an  antihistamine of her choice regularly for the next 24-48 hours.  She was instructed to follow-up with her PCP or allergist for further evaluation.  Strict return precautions given.                      Clinical Impression:   The encounter diagnosis was Contact dermatitis, unspecified contact dermatitis type, unspecified trigger.                           Ping Salazar PA-C  03/20/18 1323    Patient not seen by me. Patient seen by midlevel only.     Dora Mckeon MD  03/20/18 2021

## 2018-08-22 ENCOUNTER — HOSPITAL ENCOUNTER (EMERGENCY)
Facility: HOSPITAL | Age: 48
Discharge: HOME | End: 2018-08-22
Attending: EMERGENCY MEDICINE
Payer: MEDICAID

## 2018-08-22 VITALS
RESPIRATION RATE: 20 BRPM | WEIGHT: 160 LBS | HEIGHT: 69 IN | DIASTOLIC BLOOD PRESSURE: 62 MMHG | HEART RATE: 90 BPM | TEMPERATURE: 98 F | BODY MASS INDEX: 23.7 KG/M2 | OXYGEN SATURATION: 99 % | SYSTOLIC BLOOD PRESSURE: 130 MMHG

## 2018-08-22 DIAGNOSIS — R31.9 URINARY TRACT INFECTION WITH HEMATURIA, SITE UNSPECIFIED: Primary | ICD-10-CM

## 2018-08-22 DIAGNOSIS — N39.0 URINARY TRACT INFECTION WITH HEMATURIA, SITE UNSPECIFIED: Primary | ICD-10-CM

## 2018-08-22 LAB
BACTERIA #/AREA URNS HPF: ABNORMAL /HPF
BILIRUB UR QL STRIP: NEGATIVE
CLARITY UR: ABNORMAL
COLOR UR: ABNORMAL
GLUCOSE UR QL STRIP: ABNORMAL
HGB UR QL STRIP: ABNORMAL
HYALINE CASTS #/AREA URNS LPF: 0 /LPF
KETONES UR QL STRIP: NEGATIVE
LEUKOCYTE ESTERASE UR QL STRIP: ABNORMAL
MICROSCOPIC COMMENT: ABNORMAL
NITRITE UR QL STRIP: POSITIVE
PH UR STRIP: 6 [PH] (ref 5–8)
PROT UR QL STRIP: ABNORMAL
RBC #/AREA URNS HPF: 50 /HPF (ref 0–4)
SP GR UR STRIP: >=1.03 (ref 1–1.03)
URN SPEC COLLECT METH UR: ABNORMAL
UROBILINOGEN UR STRIP-ACNC: ABNORMAL EU/DL
WBC #/AREA URNS HPF: >100 /HPF (ref 0–5)

## 2018-08-22 PROCEDURE — 99283 EMERGENCY DEPT VISIT LOW MDM: CPT

## 2018-08-22 PROCEDURE — 81000 URINALYSIS NONAUTO W/SCOPE: CPT

## 2018-08-22 PROCEDURE — 25000003 PHARM REV CODE 250: Performed by: EMERGENCY MEDICINE

## 2018-08-22 PROCEDURE — 87086 URINE CULTURE/COLONY COUNT: CPT

## 2018-08-22 RX ORDER — PHENAZOPYRIDINE HYDROCHLORIDE 200 MG/1
200 TABLET, FILM COATED ORAL 3 TIMES DAILY
Qty: 6 TABLET | Refills: 0 | Status: SHIPPED | OUTPATIENT
Start: 2018-08-22 | End: 2019-02-12 | Stop reason: ALTCHOICE

## 2018-08-22 RX ORDER — AMOXICILLIN AND CLAVULANATE POTASSIUM 875; 125 MG/1; MG/1
1 TABLET, FILM COATED ORAL EVERY 12 HOURS
Qty: 14 TABLET | Refills: 0 | Status: SHIPPED | OUTPATIENT
Start: 2018-08-22 | End: 2019-02-12

## 2018-08-22 RX ORDER — AMOXICILLIN AND CLAVULANATE POTASSIUM 875; 125 MG/1; MG/1
1 TABLET, FILM COATED ORAL
Status: COMPLETED | OUTPATIENT
Start: 2018-08-22 | End: 2018-08-22

## 2018-08-22 RX ADMIN — AMOXICILLIN AND CLAVULANATE POTASSIUM 1 TABLET: 875; 125 TABLET, FILM COATED ORAL at 06:08

## 2018-08-22 NOTE — DISCHARGE INSTRUCTIONS
Call your urologist in the morning to discuss the recurrence of your UTI.  Take all of the prescribed medications, unless otherwise instructed by your Urologist.

## 2018-08-22 NOTE — ED PROVIDER NOTES
Encounter Date: 2018       History     Chief Complaint   Patient presents with    Dysuria     48 year old female presents to ed cc of dysuria. patient states recently diagnosed with uti took all prescribed medication. symptoms went away and then returned yesterday. patient complains of frequency/ dysuria/ burning and pressure while urinating      48F presents with dysuria, frequency, and bladder pressure x  2 days.  Symptoms are constant and severe.  Pt states she was treated with bactrim by her urologist last week.  She completed 7 days of antibiotics on .  Her period also started.  On Monday, her UTI symptoms returned.  She states she constantly feels like she needs to urinate but very little comes out.  When it does, it burns.  She did not call her urologist to discuss the return of her symptoms.  Her urologist did do a urine culture.            Review of patient's allergies indicates:   Allergen Reactions    Codeine      Pt states she is in recovery and cannot take any narcotic medications     Past Medical History:   Diagnosis Date    Asthma     Depression      Past Surgical History:   Procedure Laterality Date    TUBAL LIGATION       Family History   Problem Relation Age of Onset    Breast cancer Mother      Social History     Tobacco Use    Smoking status: Former Smoker     Types: Vaping with nicotine     Last attempt to quit: 2017     Years since quittin.5    Smokeless tobacco: Never Used   Substance Use Topics    Alcohol use: No    Drug use: No     Review of Systems   Constitutional: Negative for chills and fever.   Genitourinary: Positive for dysuria, frequency and urgency.   All other systems reviewed and are negative.      Physical Exam     Initial Vitals [18 1656]   BP Pulse Resp Temp SpO2   135/60 95 20 98.2 °F (36.8 °C) 99 %      MAP       --         Physical Exam    Nursing note and vitals reviewed.  Constitutional: She appears well-developed and well-nourished. No  distress.   HENT:   Head: Normocephalic and atraumatic.   Eyes: Conjunctivae are normal.   Neck: Normal range of motion.   Cardiovascular: Normal rate, regular rhythm and normal heart sounds.   No murmur heard.  Pulmonary/Chest: Breath sounds normal. No respiratory distress.   Abdominal: Soft. Bowel sounds are normal. She exhibits no distension. There is tenderness in the suprapubic area. There is no rigidity and no guarding.   Musculoskeletal: Normal range of motion.   Neurological: She is alert and oriented to person, place, and time.   Skin: Skin is warm and dry.   Psychiatric: She has a normal mood and affect. Her behavior is normal.         ED Course   Procedures  Labs Reviewed   URINALYSIS, REFLEX TO URINE CULTURE - Abnormal; Notable for the following components:       Result Value    Color, UA Orange (*)     Appearance, UA Cloudy (*)     Specific Gravity, UA >=1.030 (*)     Protein, UA 2+ (*)     Glucose, UA Trace (*)     Occult Blood UA 3+ (*)     Nitrite, UA Positive (*)     Urobilinogen, UA 2.0-3.0 (*)     Leukocytes, UA 2+ (*)     All other components within normal limits    Narrative:     Preferred Collection Type->Urine, Clean Catch   URINALYSIS MICROSCOPIC - Abnormal; Notable for the following components:    RBC, UA 50 (*)     WBC, UA >100 (*)     All other components within normal limits    Narrative:     Preferred Collection Type->Urine, Clean Catch   CULTURE, URINE          Imaging Results    None          Medical Decision Making:   Clinical Tests:   Lab Tests: Ordered and Reviewed  ED Management:  48F recently treated for UTI, completed abx 2 days ago, presents return of symptoms.  Will do bladder scan to check for retention.  Bladder scan with approx 50cc.  Send UAR.  +UTI.  Will start augmentin.  Pt instructed to call her treating urologist in the morning.                      Clinical Impression:   The encounter diagnosis was Urinary tract infection with hematuria, site unspecified.                              Karla Dawn MD  08/22/18 [id number]

## 2018-08-24 LAB — BACTERIA UR CULT: NO GROWTH

## 2018-10-12 ENCOUNTER — TELEPHONE (OUTPATIENT)
Dept: OBSTETRICS AND GYNECOLOGY | Facility: CLINIC | Age: 48
End: 2018-10-12

## 2018-10-12 NOTE — TELEPHONE ENCOUNTER
----- Message from Matias Brewer sent at 10/12/2018 11:12 AM CDT -----  Contact: self [phone]  Patient would like to know if she have something prescribed for a yeast infection. Please call and advise.

## 2019-02-12 ENCOUNTER — OFFICE VISIT (OUTPATIENT)
Dept: UROLOGY | Facility: CLINIC | Age: 49
End: 2019-02-12
Payer: MEDICAID

## 2019-02-12 VITALS
SYSTOLIC BLOOD PRESSURE: 110 MMHG | DIASTOLIC BLOOD PRESSURE: 66 MMHG | BODY MASS INDEX: 24.25 KG/M2 | HEART RATE: 74 BPM | WEIGHT: 164.19 LBS

## 2019-02-12 DIAGNOSIS — R39.15 URINARY URGENCY: ICD-10-CM

## 2019-02-12 DIAGNOSIS — R35.1 NOCTURIA: ICD-10-CM

## 2019-02-12 DIAGNOSIS — R35.0 URINARY FREQUENCY: Primary | ICD-10-CM

## 2019-02-12 LAB
BILIRUB SERPL-MCNC: NORMAL MG/DL
BLOOD URINE, POC: NORMAL
COLOR, POC UA: YELLOW
GLUCOSE UR QL STRIP: NORMAL
KETONES UR QL STRIP: NORMAL
LEUKOCYTE ESTERASE URINE, POC: NORMAL
NITRITE, POC UA: NORMAL
PH, POC UA: 5.5
PROTEIN, POC: NORMAL
SPECIFIC GRAVITY, POC UA: 1
UROBILINOGEN, POC UA: 0.2

## 2019-02-12 PROCEDURE — 99999 PR PBB SHADOW E&M-EST. PATIENT-LVL IV: CPT | Mod: PBBFAC,,, | Performed by: NURSE PRACTITIONER

## 2019-02-12 PROCEDURE — 81002 URINALYSIS NONAUTO W/O SCOPE: CPT | Mod: PBBFAC,PO | Performed by: NURSE PRACTITIONER

## 2019-02-12 PROCEDURE — 87086 URINE CULTURE/COLONY COUNT: CPT

## 2019-02-12 PROCEDURE — 99213 PR OFFICE/OUTPT VISIT, EST, LEVL III, 20-29 MIN: ICD-10-PCS | Mod: S$PBB,,, | Performed by: NURSE PRACTITIONER

## 2019-02-12 PROCEDURE — 99214 OFFICE O/P EST MOD 30 MIN: CPT | Mod: PBBFAC,PO | Performed by: NURSE PRACTITIONER

## 2019-02-12 PROCEDURE — 99999 PR PBB SHADOW E&M-EST. PATIENT-LVL IV: ICD-10-PCS | Mod: PBBFAC,,, | Performed by: NURSE PRACTITIONER

## 2019-02-12 PROCEDURE — 99213 OFFICE O/P EST LOW 20 MIN: CPT | Mod: S$PBB,,, | Performed by: NURSE PRACTITIONER

## 2019-02-12 NOTE — PROGRESS NOTES
Subjective:       Patient ID: Fawn Waters is a 48 y.o. female.    Chief Complaint: Urinary Frequency; Nocturia; and Urinary Urgency    Patient is new to me. He is a 47 yo WF who is here today with c/o urinary frequency, urgency, and nocturia. She denies urinary incontinence at this time. Patient I not taking any medications for her symptoms.       Urinary Frequency    This is a chronic problem. The current episode started more than 1 year ago. The problem has been unchanged. The pain is at a severity of 0/10. The patient is experiencing no pain. There has been no fever. She is not sexually active. There is no history of pyelonephritis. Associated symptoms include frequency, nausea, urgency and constipation (miralax). Pertinent negatives include no behavior changes, chills, discharge, flank pain, hematuria, hesitancy, sweats, vomiting, bubble bath use or withholding. She has tried antibiotics (Cipro) for the symptoms. The treatment provided mild relief. There is no history of diabetes mellitus, hypertension, kidney stones, recurrent UTIs, a single kidney or a urological procedure.     Review of Systems   Constitutional: Positive for fatigue. Negative for appetite change, chills and fever.   Gastrointestinal: Positive for abdominal pain, constipation (miralax) and nausea. Negative for diarrhea and vomiting. Anal bleeding: suprapubic discomfort.   Genitourinary: Positive for frequency and urgency. Negative for decreased urine volume, difficulty urinating, dysuria, flank pain, hematuria, hesitancy, pelvic pain, vaginal bleeding, vaginal discharge and vaginal pain.        Nocturia x6 or greater   Musculoskeletal: Positive for back pain (bilateral lower back).   Neurological: Negative for dizziness, weakness and headaches.   Psychiatric/Behavioral: Negative.        Objective:      Physical Exam   Constitutional: She is oriented to person, place, and time. She appears well-developed and well-nourished.   HENT:    Head: Normocephalic and atraumatic.   Eyes: EOM are normal. Pupils are equal, round, and reactive to light.   Neck: Normal range of motion. Neck supple.   Cardiovascular: Normal rate, regular rhythm, normal heart sounds and intact distal pulses.   Pulmonary/Chest: Effort normal and breath sounds normal. No respiratory distress.   Abdominal: Soft. Bowel sounds are normal. There is no tenderness.   Genitourinary:   Genitourinary Comments: PVR=20 mL   Musculoskeletal: Normal range of motion. She exhibits no edema.   Lymphadenopathy:     She has no cervical adenopathy.   Neurological: She is alert and oriented to person, place, and time. Coordination normal.   Skin: Skin is warm and dry.   Psychiatric: She has a normal mood and affect. Her behavior is normal. Judgment and thought content normal.   Nursing note and vitals reviewed.      Assessment:       1. Urinary frequency    2. Urinary urgency    3. Nocturia        Plan:       Fawn HEAD was seen today for urinary frequency.    Diagnoses and all orders for this visit:    Urinary frequency  -     POCT URINE DIPSTICK WITHOUT MICROSCOPE  -     Urine culture    Urinary urgency  -     POCT URINE DIPSTICK WITHOUT MICROSCOPE  -     Urine culture    Nocturia  -     POCT URINE DIPSTICK WITHOUT MICROSCOPE  -     Urine culture    Other orders  1. PVR  2. Consider OAB medication if urine cx comes back negative.   3. Taking daily probiotic (acidophilus) maybe helpful for digestion health.    Follow-up pending urine cx results.     Tiki Johnson NP

## 2019-02-12 NOTE — PATIENT INSTRUCTIONS
1. PVR  2. Urine dipstick  3. Urine cx  4. Taking daily probiotic (acidophilus) maybe helpful for digestion health.  5. Consider OAB medication if urine cx comes back negative.   6. Follow-up pending urine cx results.

## 2019-02-13 LAB — BACTERIA UR CULT: NO GROWTH

## 2019-02-14 ENCOUNTER — TELEPHONE (OUTPATIENT)
Dept: UROLOGY | Facility: CLINIC | Age: 49
End: 2019-02-14

## 2019-02-14 DIAGNOSIS — N32.81 OAB (OVERACTIVE BLADDER): Primary | ICD-10-CM

## 2019-02-14 RX ORDER — TROSPIUM CHLORIDE 20 MG/1
20 TABLET, FILM COATED ORAL 2 TIMES DAILY
Qty: 60 TABLET | Refills: 1 | Status: SHIPPED | OUTPATIENT
Start: 2019-02-14 | End: 2019-03-18 | Stop reason: SDUPTHER

## 2019-02-14 NOTE — TELEPHONE ENCOUNTER
----- Message from Tiki Johnson NP sent at 2/14/2019 10:52 AM CST -----  Please inform patient her urine cx was normal. She does not have a UTI. I would like to start pt on OAB medication (trospium 20 mg twice daily). Script sent to [facility] ([facility] [place] Please schedule a 6 week F/U appt with me.

## 2019-03-18 ENCOUNTER — OFFICE VISIT (OUTPATIENT)
Dept: UROLOGY | Facility: CLINIC | Age: 49
End: 2019-03-18
Payer: MEDICAID

## 2019-03-18 VITALS
DIASTOLIC BLOOD PRESSURE: 78 MMHG | HEART RATE: 87 BPM | HEIGHT: 69 IN | BODY MASS INDEX: 23.99 KG/M2 | WEIGHT: 162 LBS | SYSTOLIC BLOOD PRESSURE: 127 MMHG

## 2019-03-18 DIAGNOSIS — N32.81 OAB (OVERACTIVE BLADDER): Primary | ICD-10-CM

## 2019-03-18 DIAGNOSIS — R35.1 NOCTURIA: ICD-10-CM

## 2019-03-18 PROCEDURE — 99213 OFFICE O/P EST LOW 20 MIN: CPT | Mod: PBBFAC,PO | Performed by: NURSE PRACTITIONER

## 2019-03-18 PROCEDURE — 99999 PR PBB SHADOW E&M-EST. PATIENT-LVL III: CPT | Mod: PBBFAC,,, | Performed by: NURSE PRACTITIONER

## 2019-03-18 PROCEDURE — 99212 OFFICE O/P EST SF 10 MIN: CPT | Mod: S$PBB,,, | Performed by: NURSE PRACTITIONER

## 2019-03-18 PROCEDURE — 99999 PR PBB SHADOW E&M-EST. PATIENT-LVL III: ICD-10-PCS | Mod: PBBFAC,,, | Performed by: NURSE PRACTITIONER

## 2019-03-18 PROCEDURE — 99212 PR OFFICE/OUTPT VISIT, EST, LEVL II, 10-19 MIN: ICD-10-PCS | Mod: S$PBB,,, | Performed by: NURSE PRACTITIONER

## 2019-03-18 RX ORDER — CETIRIZINE HYDROCHLORIDE 10 MG/1
TABLET ORAL
COMMUNITY
Start: 2019-02-19

## 2019-03-18 RX ORDER — TROSPIUM CHLORIDE 20 MG/1
20 TABLET, FILM COATED ORAL 2 TIMES DAILY
Qty: 60 TABLET | Refills: 1 | Status: SHIPPED | OUTPATIENT
Start: 2019-03-18 | End: 2019-05-20 | Stop reason: SDUPTHER

## 2019-03-18 RX ORDER — ALBUTEROL SULFATE 90 UG/1
AEROSOL, METERED RESPIRATORY (INHALATION)
COMMUNITY
Start: 2019-02-18 | End: 2023-02-14

## 2019-03-18 NOTE — PROGRESS NOTES
Subjective:       Patient ID: Fawn Waters is a 48 y.o. female.    Chief Complaint: Follow-up    Patient is here today for her 6 week follow-up for urinary frequency, urgency, and nocturia. She denies urinary incontinence at this time. Patient is currently taking trospium 20 mg twice daily for OAB. She reports improvement in all her urinary symptoms since starting her medication. Patient reports the insurance coverage on her medication may change in May 2019.       Urinary Frequency    This is a chronic problem. The current episode started more than 1 year ago. The problem has been gradually improving. The pain is at a severity of 0/10. The patient is experiencing no pain. There has been no fever. She is not sexually active. There is no history of pyelonephritis. Associated symptoms include frequency (improved) and urgency (improved). Pertinent negatives include no behavior changes, chills, flank pain, hematuria, hesitancy, nausea, vomiting, bubble bath use, constipation or withholding. Treatments tried: trospium 20 mg  The treatment provided significant relief. There is no history of diabetes mellitus, hypertension, kidney stones, recurrent UTIs, a single kidney or a urological procedure.     Review of Systems   Constitutional: Negative for appetite change, chills and fever.   Gastrointestinal: Negative for abdominal pain, constipation, diarrhea, nausea and vomiting.   Genitourinary: Positive for frequency (improved) and urgency (improved). Negative for decreased urine volume, difficulty urinating, dysuria, flank pain, hematuria, hesitancy, vaginal bleeding, vaginal discharge and vaginal pain.        Nocturia x2   Neurological: Negative for dizziness and headaches.   Psychiatric/Behavioral: Negative.        Objective:      Physical Exam   Constitutional: She is oriented to person, place, and time. She appears well-developed and well-nourished.   HENT:   Head: Normocephalic and atraumatic.   Eyes: EOM are  normal. Pupils are equal, round, and reactive to light.   Neck: Normal range of motion.   Cardiovascular: Normal rate.   Pulmonary/Chest: Effort normal. No respiratory distress.   Abdominal: Soft. There is no tenderness.   Musculoskeletal: Normal range of motion. She exhibits no edema.   Neurological: She is alert and oriented to person, place, and time. Coordination normal.   Skin: Skin is warm and dry.   Psychiatric: She has a normal mood and affect. Her behavior is normal. Judgment and thought content normal.   Nursing note and vitals reviewed.      Assessment:       1. OAB (overactive bladder)    2. Nocturia        Plan:       Fawn HEAD was seen today for follow-up.    Diagnoses and all orders for this visit:    OAB (overactive bladder)  -     trospium (SANCTURA) 20 mg Tab tablet; Take 1 tablet (20 mg total) by mouth 2 (two) times daily.    Nocturia    Follow-up in 2 weeks.     Tiki Johnson NP

## 2019-05-20 ENCOUNTER — OFFICE VISIT (OUTPATIENT)
Dept: UROLOGY | Facility: CLINIC | Age: 49
End: 2019-05-20
Payer: MEDICAID

## 2019-05-20 VITALS
WEIGHT: 157.19 LBS | HEIGHT: 69 IN | DIASTOLIC BLOOD PRESSURE: 70 MMHG | OXYGEN SATURATION: 98 % | HEART RATE: 93 BPM | SYSTOLIC BLOOD PRESSURE: 116 MMHG | BODY MASS INDEX: 23.28 KG/M2

## 2019-05-20 DIAGNOSIS — N32.81 OAB (OVERACTIVE BLADDER): Primary | ICD-10-CM

## 2019-05-20 DIAGNOSIS — R35.1 NOCTURIA: ICD-10-CM

## 2019-05-20 PROBLEM — R39.15 URINARY URGENCY: Status: RESOLVED | Noted: 2019-02-12 | Resolved: 2019-05-20

## 2019-05-20 PROBLEM — R35.0 URINARY FREQUENCY: Status: RESOLVED | Noted: 2019-02-12 | Resolved: 2019-05-20

## 2019-05-20 PROCEDURE — 99212 OFFICE O/P EST SF 10 MIN: CPT | Mod: S$PBB,,, | Performed by: NURSE PRACTITIONER

## 2019-05-20 PROCEDURE — 99999 PR PBB SHADOW E&M-EST. PATIENT-LVL IV: ICD-10-PCS | Mod: PBBFAC,,, | Performed by: NURSE PRACTITIONER

## 2019-05-20 PROCEDURE — 99999 PR PBB SHADOW E&M-EST. PATIENT-LVL IV: CPT | Mod: PBBFAC,,, | Performed by: NURSE PRACTITIONER

## 2019-05-20 PROCEDURE — 99212 PR OFFICE/OUTPT VISIT, EST, LEVL II, 10-19 MIN: ICD-10-PCS | Mod: S$PBB,,, | Performed by: NURSE PRACTITIONER

## 2019-05-20 PROCEDURE — 99214 OFFICE O/P EST MOD 30 MIN: CPT | Mod: PBBFAC,PO | Performed by: NURSE PRACTITIONER

## 2019-05-20 RX ORDER — TROSPIUM CHLORIDE 20 MG/1
20 TABLET, FILM COATED ORAL 2 TIMES DAILY
Qty: 60 TABLET | Refills: 11 | Status: SHIPPED | OUTPATIENT
Start: 2019-05-20 | End: 2019-12-12 | Stop reason: ALTCHOICE

## 2019-05-20 NOTE — PROGRESS NOTES
Subjective:       Patient ID: Fawn Waters is a 49 y.o. female.    Chief Complaint: Follow-up (2 month follow up )    Patient is here today for her 2 month follow-up appointment. She was started on trospium 20 mg at last appointment for OAB symptoms. Patient reports taking trospium 20 mg daily instead of twice daily and reports her symptoms have resolved.     Urinary Frequency    This is a chronic problem. The current episode started more than 1 month ago. The problem has been resolved. The pain is at a severity of 0/10. The patient is experiencing no pain. There has been no fever. There is no history of pyelonephritis. Pertinent negatives include no behavior changes, chills, discharge, flank pain, frequency, hematuria, hesitancy, nausea, possible pregnancy, urgency, vomiting, weight loss, bubble bath use, constipation, rash or withholding. Treatments tried: trospium 20 mg. The treatment provided significant relief. There is no history of diabetes mellitus, hypertension, kidney stones, recurrent UTIs, a single kidney or a urological procedure.     Review of Systems   Constitutional: Negative for appetite change, chills, fatigue, fever and weight loss.   Gastrointestinal: Negative for abdominal pain, constipation, diarrhea, nausea and vomiting.   Genitourinary: Positive for menstrual problem (seeing GYN). Negative for decreased urine volume, difficulty urinating, dysuria, flank pain, frequency, hematuria, hesitancy, pelvic pain, urgency, vaginal bleeding, vaginal discharge and vaginal pain.   Skin: Negative for rash and wound.   Neurological: Negative for dizziness and headaches.   Psychiatric/Behavioral: Negative.        Objective:      Physical Exam   Constitutional: She is oriented to person, place, and time. She appears well-developed and well-nourished. No distress.   HENT:   Head: Normocephalic and atraumatic.   Eyes: Pupils are equal, round, and reactive to light. EOM are normal.   Neck: Normal range of  motion.   Cardiovascular: Normal rate.   Pulmonary/Chest: Effort normal. No respiratory distress.   Abdominal: Soft. There is no tenderness.   Musculoskeletal: Normal range of motion. She exhibits no edema.   Neurological: She is alert and oriented to person, place, and time. Coordination normal.   Skin: Skin is warm and dry.   Psychiatric: She has a normal mood and affect. Her behavior is normal. Judgment and thought content normal.   Nursing note and vitals reviewed.      Assessment:       1. OAB (overactive bladder)    2. Nocturia        Plan:       Fawn HEAD was seen today for follow-up.    Diagnoses and all orders for this visit:    OAB (overactive bladder)  -     trospium (SANCTURA) 20 mg Tab tablet; Take 1 tablet (20 mg total) by mouth 2 (two) times daily.    Nocturia    Follow-up in 1 year and as needed.     Tkii Johnson NP

## 2019-05-22 ENCOUNTER — TELEPHONE (OUTPATIENT)
Dept: UROLOGY | Facility: CLINIC | Age: 49
End: 2019-05-22

## 2019-05-22 NOTE — TELEPHONE ENCOUNTER
----- Message from Kimmy Turpin MA sent at 5/22/2019 12:19 PM CDT -----  Contact: self  Patient states neto told her to contact the urologist office regarding medication that was recently prescribed from Julia Johnson NP. May need to be changed or someone to over ride it.    Patient states she is not sure of the name.    neto in [place] [phone]

## 2019-07-25 ENCOUNTER — TELEPHONE (OUTPATIENT)
Dept: UROLOGY | Facility: CLINIC | Age: 49
End: 2019-07-25

## 2019-07-25 ENCOUNTER — LAB VISIT (OUTPATIENT)
Dept: LAB | Facility: HOSPITAL | Age: 49
End: 2019-07-25
Attending: NURSE PRACTITIONER
Payer: MEDICAID

## 2019-07-25 DIAGNOSIS — N32.81 OAB (OVERACTIVE BLADDER): ICD-10-CM

## 2019-07-25 DIAGNOSIS — R39.15 URINARY URGENCY: ICD-10-CM

## 2019-07-25 DIAGNOSIS — R35.1 NOCTURIA: ICD-10-CM

## 2019-07-25 DIAGNOSIS — R30.0 DYSURIA: ICD-10-CM

## 2019-07-25 DIAGNOSIS — N32.81 OAB (OVERACTIVE BLADDER): Primary | ICD-10-CM

## 2019-07-25 LAB
BACTERIA #/AREA URNS HPF: ABNORMAL /HPF
BILIRUB UR QL STRIP: NEGATIVE
CLARITY UR: CLEAR
COLOR UR: YELLOW
GLUCOSE UR QL STRIP: NEGATIVE
HGB UR QL STRIP: ABNORMAL
KETONES UR QL STRIP: NEGATIVE
LEUKOCYTE ESTERASE UR QL STRIP: ABNORMAL
MICROSCOPIC COMMENT: ABNORMAL
NITRITE UR QL STRIP: POSITIVE
PH UR STRIP: 7 [PH] (ref 5–8)
PROT UR QL STRIP: NEGATIVE
RBC #/AREA URNS HPF: 0 /HPF (ref 0–4)
SP GR UR STRIP: <=1.005 (ref 1–1.03)
SQUAMOUS #/AREA URNS HPF: 3 /HPF
URN SPEC COLLECT METH UR: ABNORMAL
UROBILINOGEN UR STRIP-ACNC: NEGATIVE EU/DL
WBC #/AREA URNS HPF: 25 /HPF (ref 0–5)

## 2019-07-25 PROCEDURE — 87077 CULTURE AEROBIC IDENTIFY: CPT

## 2019-07-25 PROCEDURE — 81000 URINALYSIS NONAUTO W/SCOPE: CPT

## 2019-07-25 PROCEDURE — 87186 SC STD MICRODIL/AGAR DIL: CPT

## 2019-07-25 PROCEDURE — 87088 URINE BACTERIA CULTURE: CPT

## 2019-07-25 PROCEDURE — 87086 URINE CULTURE/COLONY COUNT: CPT

## 2019-07-25 RX ORDER — PHENAZOPYRIDINE HYDROCHLORIDE 200 MG/1
200 TABLET, FILM COATED ORAL 3 TIMES DAILY PRN
Qty: 9 TABLET | Refills: 0 | Status: SHIPPED | OUTPATIENT
Start: 2019-07-25 | End: 2019-07-28

## 2019-07-25 NOTE — TELEPHONE ENCOUNTER
----- Message from Tiki Johnson NP sent at 7/25/2019 12:43 PM CDT -----  Contact: patient  I sent her a script for Pyridium to [facility] ([facility] [address] [facility] [address] to help with burning until we get her urine cx back.   ----- Message -----  From: Kimmy Turpin MA  Sent: 7/25/2019  12:00 PM  To: Tiki Johnson NP    Patient states she has been taking AZO for 3 days and wants to know how long should she continue taking this for?    She is having urgency and burning with urniation.    She did go to the lab today and get a urine sample done.    thanks

## 2019-07-28 LAB — BACTERIA UR CULT: ABNORMAL

## 2019-07-29 ENCOUNTER — TELEPHONE (OUTPATIENT)
Dept: UROLOGY | Facility: CLINIC | Age: 49
End: 2019-07-29

## 2019-07-29 DIAGNOSIS — N30.01 ACUTE CYSTITIS WITH HEMATURIA: Primary | ICD-10-CM

## 2019-07-29 RX ORDER — SULFAMETHOXAZOLE AND TRIMETHOPRIM 800; 160 MG/1; MG/1
1 TABLET ORAL 2 TIMES DAILY
Qty: 14 TABLET | Refills: 0 | Status: SHIPPED | OUTPATIENT
Start: 2019-07-29 | End: 2019-08-05

## 2019-07-29 NOTE — TELEPHONE ENCOUNTER
----- Message from Tiki Johnson NP sent at 7/29/2019 10:12 AM CDT -----  Please inform patient she has a UTI. Script for Bactrim DS sent to [facility] ([facility] [address] [facility] [address] Follow-up in 2 weeks for urine recheck/Urine cx (nurse visit). If patient prefer to go to lab for urine recheck that's fine.

## 2019-08-07 DIAGNOSIS — N39.0 URINARY TRACT INFECTION WITHOUT HEMATURIA, SITE UNSPECIFIED: Primary | ICD-10-CM

## 2019-11-14 DIAGNOSIS — M79.673 FOOT PAIN: Primary | ICD-10-CM

## 2019-11-18 ENCOUNTER — HOSPITAL ENCOUNTER (OUTPATIENT)
Dept: RADIOLOGY | Facility: HOSPITAL | Age: 49
Discharge: HOME | End: 2019-11-18
Attending: PODIATRIST
Payer: MEDICAID

## 2019-11-18 DIAGNOSIS — M79.673 FOOT PAIN: ICD-10-CM

## 2019-11-18 PROCEDURE — 73630 XR FOOT COMPLETE 3 VIEW BILATERAL: ICD-10-PCS | Mod: 26,50,, | Performed by: RADIOLOGY

## 2019-11-18 PROCEDURE — 73630 X-RAY EXAM OF FOOT: CPT | Mod: 26,50,, | Performed by: RADIOLOGY

## 2019-11-18 PROCEDURE — 73630 X-RAY EXAM OF FOOT: CPT | Mod: 50,TC,FY

## 2019-12-10 ENCOUNTER — OFFICE VISIT (OUTPATIENT)
Dept: UROLOGY | Facility: CLINIC | Age: 49
End: 2019-12-10
Payer: MEDICAID

## 2019-12-10 VITALS
RESPIRATION RATE: 18 BRPM | SYSTOLIC BLOOD PRESSURE: 132 MMHG | TEMPERATURE: 99 F | DIASTOLIC BLOOD PRESSURE: 62 MMHG | WEIGHT: 157 LBS | HEART RATE: 78 BPM | BODY MASS INDEX: 23.25 KG/M2 | OXYGEN SATURATION: 97 % | HEIGHT: 69 IN

## 2019-12-10 DIAGNOSIS — R35.0 URINARY FREQUENCY: Primary | ICD-10-CM

## 2019-12-10 DIAGNOSIS — R35.1 NOCTURIA: ICD-10-CM

## 2019-12-10 DIAGNOSIS — R39.15 URINARY URGENCY: ICD-10-CM

## 2019-12-10 LAB
BILIRUB SERPL-MCNC: NORMAL MG/DL
BLOOD URINE, POC: NORMAL
COLOR, POC UA: YELLOW
GLUCOSE UR QL STRIP: NORMAL
KETONES UR QL STRIP: NORMAL
LEUKOCYTE ESTERASE URINE, POC: NORMAL
NITRITE, POC UA: NORMAL
PH, POC UA: 7.5
PROTEIN, POC: NORMAL
SPECIFIC GRAVITY, POC UA: 1
UROBILINOGEN, POC UA: 0.2

## 2019-12-10 PROCEDURE — 99215 OFFICE O/P EST HI 40 MIN: CPT | Mod: PBBFAC,PO | Performed by: NURSE PRACTITIONER

## 2019-12-10 PROCEDURE — 99999 PR PBB SHADOW E&M-EST. PATIENT-LVL V: CPT | Mod: PBBFAC,,, | Performed by: NURSE PRACTITIONER

## 2019-12-10 PROCEDURE — 87086 URINE CULTURE/COLONY COUNT: CPT

## 2019-12-10 PROCEDURE — 99212 PR OFFICE/OUTPT VISIT, EST, LEVL II, 10-19 MIN: ICD-10-PCS | Mod: S$PBB,,, | Performed by: NURSE PRACTITIONER

## 2019-12-10 PROCEDURE — 99999 PR PBB SHADOW E&M-EST. PATIENT-LVL V: ICD-10-PCS | Mod: PBBFAC,,, | Performed by: NURSE PRACTITIONER

## 2019-12-10 PROCEDURE — 99212 OFFICE O/P EST SF 10 MIN: CPT | Mod: S$PBB,,, | Performed by: NURSE PRACTITIONER

## 2019-12-10 PROCEDURE — 81002 URINALYSIS NONAUTO W/O SCOPE: CPT | Mod: PBBFAC,PO | Performed by: NURSE PRACTITIONER

## 2019-12-10 RX ORDER — DICLOFENAC SODIUM 75 MG/1
TABLET, DELAYED RELEASE ORAL
Refills: 3 | COMMUNITY
Start: 2019-11-14 | End: 2023-02-14

## 2019-12-10 RX ORDER — GABAPENTIN 300 MG/1
CAPSULE ORAL
COMMUNITY
Start: 2019-12-04 | End: 2023-02-14

## 2019-12-10 RX ORDER — METHYLPREDNISOLONE 4 MG/1
TABLET ORAL
Refills: 0 | COMMUNITY
Start: 2019-12-05 | End: 2023-02-14

## 2019-12-10 NOTE — PROGRESS NOTES
Subjective:       Patient ID: Fawn Waters is a 49 y.o. female.    Chief Complaint: Urinary Frequency (OAB follow up) and Medication Problem (trospium not working)    Patient is here today with c/o urinary frequency and nocturia. She is currently taking trospium 20 mg BID for a OAB. Patient states she doesn't feel like medication is no longer working for her OAB.     Urinary Frequency    This is a new problem. Episode onset: 1 month ago. The problem has been gradually worsening. The pain is at a severity of 0/10. The patient is experiencing no pain. There has been no fever. There is no history of pyelonephritis. Associated symptoms include frequency, urgency and constipation. Pertinent negatives include no behavior changes, chills, discharge, flank pain, hematuria, hesitancy, nausea, possible pregnancy, sweats, vomiting, weight loss, bubble bath use, rash or withholding. Treatments tried: trospium 20 mg BID. The treatment provided no relief. There is no history of diabetes mellitus, hypertension, kidney stones, recurrent UTIs, a single kidney or a urological procedure.     Review of Systems   Constitutional: Negative for appetite change, chills, fatigue, fever and weight loss.   Gastrointestinal: Positive for abdominal pain (suprapubic) and constipation. Negative for diarrhea, nausea and vomiting.   Genitourinary: Positive for frequency and urgency. Negative for decreased urine volume, difficulty urinating, dysuria, flank pain, hematuria, hesitancy, vaginal bleeding, vaginal discharge and vaginal pain.        Nocturia x4-5   Musculoskeletal: Positive for back pain (bilateral lower).   Skin: Negative for rash.   Psychiatric/Behavioral: Negative.        Objective:      Physical Exam   Constitutional: She is oriented to person, place, and time. She appears well-developed and well-nourished. No distress.   HENT:   Head: Normocephalic and atraumatic.   Eyes: Pupils are equal, round, and reactive to light. EOM are  normal.   Neck: Normal range of motion.   Cardiovascular: Normal rate.   Pulmonary/Chest: Effort normal. No respiratory distress.   Abdominal: Soft. There is no tenderness.   Genitourinary:   Genitourinary Comments: PVR = 50 mL   Musculoskeletal: Normal range of motion. She exhibits no edema.   Neurological: She is alert and oriented to person, place, and time. Coordination normal.   Skin: Skin is warm and dry.   Psychiatric: She has a normal mood and affect. Her behavior is normal. Judgment and thought content normal.   Nursing note and vitals reviewed.      Assessment:       1. Urinary frequency    2. Urinary urgency    3. Nocturia        Plan:       Fawn HEAD was seen today for urinary frequency and medication problem.    Diagnoses and all orders for this visit:    Urinary frequency  -     POCT URINE DIPSTICK WITHOUT MICROSCOPE  -     Urine culture    Urinary urgency  -     POCT URINE DIPSTICK WITHOUT MICROSCOPE  -     Urine culture    Nocturia  -     POCT URINE DIPSTICK WITHOUT MICROSCOPE  -     Urine culture    Other order  1. Bladder scan (PVR)  2. If urine cx is negative for UTI, try an alternative OAB medication.     Follow-up pending urine cx result.     Tiki Johnson NP

## 2019-12-10 NOTE — PATIENT INSTRUCTIONS
1. Bladder scan (PVR)  2. Urine dipstick  3. Urine cx  4. If urine cx is negative for UTI, try an alternative OAB medication.   5. Follow-up pending urine cx result.

## 2019-12-11 LAB — BACTERIA UR CULT: NO GROWTH

## 2019-12-12 ENCOUNTER — TELEPHONE (OUTPATIENT)
Dept: UROLOGY | Facility: CLINIC | Age: 49
End: 2019-12-12

## 2019-12-12 DIAGNOSIS — N32.81 OAB (OVERACTIVE BLADDER): Primary | ICD-10-CM

## 2019-12-12 RX ORDER — FESOTERODINE FUMARATE 4 MG/1
4 TABLET, EXTENDED RELEASE ORAL DAILY
Qty: 30 TABLET | Refills: 11 | Status: SHIPPED | OUTPATIENT
Start: 2019-12-12 | End: 2020-06-16 | Stop reason: SDUPTHER

## 2019-12-12 NOTE — TELEPHONE ENCOUNTER
----- Message from Tiki Johnson NP sent at 12/12/2019 10:45 AM CST -----  Please inform patient her urine cx was normal. She does not have a UTI. Discontinue trospium. New script for alternative OAB medication (Toviaz) sent to [facility]. Follow-up in 4 weeks.

## 2020-06-16 ENCOUNTER — OFFICE VISIT (OUTPATIENT)
Dept: UROLOGY | Facility: CLINIC | Age: 50
End: 2020-06-16
Payer: MEDICAID

## 2020-06-16 VITALS
SYSTOLIC BLOOD PRESSURE: 124 MMHG | TEMPERATURE: 99 F | HEIGHT: 69 IN | DIASTOLIC BLOOD PRESSURE: 67 MMHG | BODY MASS INDEX: 22.51 KG/M2 | HEART RATE: 74 BPM | WEIGHT: 152 LBS

## 2020-06-16 DIAGNOSIS — R39.15 URINARY URGENCY: ICD-10-CM

## 2020-06-16 DIAGNOSIS — N32.81 OAB (OVERACTIVE BLADDER): Primary | ICD-10-CM

## 2020-06-16 DIAGNOSIS — R35.1 NOCTURIA: ICD-10-CM

## 2020-06-16 DIAGNOSIS — N39.41 URGE URINARY INCONTINENCE: ICD-10-CM

## 2020-06-16 DIAGNOSIS — R35.0 URINARY FREQUENCY: ICD-10-CM

## 2020-06-16 PROBLEM — N30.01 ACUTE CYSTITIS WITH HEMATURIA: Status: RESOLVED | Noted: 2019-07-29 | Resolved: 2020-06-16

## 2020-06-16 LAB
BILIRUB SERPL-MCNC: NORMAL MG/DL
BLOOD URINE, POC: NORMAL
CLARITY, POC UA: CLEAR
COLOR, POC UA: YELLOW
GLUCOSE UR QL STRIP: NORMAL
KETONES UR QL STRIP: NORMAL
LEUKOCYTE ESTERASE URINE, POC: NORMAL
NITRITE, POC UA: NORMAL
PH, POC UA: 6.5
PROTEIN, POC: NORMAL
SPECIFIC GRAVITY, POC UA: 1
UROBILINOGEN, POC UA: 0.2

## 2020-06-16 PROCEDURE — 99214 OFFICE O/P EST MOD 30 MIN: CPT | Mod: PBBFAC,PO | Performed by: NURSE PRACTITIONER

## 2020-06-16 PROCEDURE — 81002 URINALYSIS NONAUTO W/O SCOPE: CPT | Mod: PBBFAC,PO | Performed by: NURSE PRACTITIONER

## 2020-06-16 PROCEDURE — 99214 OFFICE O/P EST MOD 30 MIN: CPT | Mod: S$PBB,,, | Performed by: NURSE PRACTITIONER

## 2020-06-16 PROCEDURE — 99999 PR PBB SHADOW E&M-EST. PATIENT-LVL IV: CPT | Mod: PBBFAC,,, | Performed by: NURSE PRACTITIONER

## 2020-06-16 PROCEDURE — 99214 PR OFFICE/OUTPT VISIT, EST, LEVL IV, 30-39 MIN: ICD-10-PCS | Mod: S$PBB,,, | Performed by: NURSE PRACTITIONER

## 2020-06-16 PROCEDURE — 99999 PR PBB SHADOW E&M-EST. PATIENT-LVL IV: ICD-10-PCS | Mod: PBBFAC,,, | Performed by: NURSE PRACTITIONER

## 2020-06-16 PROCEDURE — 87086 URINE CULTURE/COLONY COUNT: CPT

## 2020-06-16 RX ORDER — FESOTERODINE FUMARATE 4 MG/1
8 TABLET, FILM COATED, EXTENDED RELEASE ORAL DAILY
Qty: 60 TABLET | Refills: 11 | Status: SHIPPED | OUTPATIENT
Start: 2020-06-16 | End: 2020-07-28 | Stop reason: SDUPTHER

## 2020-06-16 NOTE — PATIENT INSTRUCTIONS
1. Bladder scan (PVR)  2. Urine dipstick  3. Urine cx  4. Increase Toviaz to 8 mg daily for overactive bladder.  5. Follow-up in 2 weeks with Virtual Visit.

## 2020-06-16 NOTE — PROGRESS NOTES
Subjective:       Patient ID: Fawn Waters is a 50 y.o. female.    Chief Complaint: Follow-up (OAB), Nocturia, Urinary Frequency, and Urinary Urgency    Patient is a 49 yo WF who is here today for a 6 months f/u for OAB. She is currently taking Toviaz 4 mg daily for this issue. She reports the medication is no longer working, so she start back taking trospium 20 mg daily. She reports the trospium seems to work, but she feels like she's not urinating at all with that medication.     Follow-up  This is a chronic (OAB) problem. The current episode started more than 1 year ago. The problem occurs daily. The problem has been gradually worsening. Associated symptoms include urinary symptoms. Pertinent negatives include no abdominal pain, anorexia, arthralgias, change in bowel habit, chills, diaphoresis, fatigue, fever, headaches, nausea, swollen glands, vomiting or weakness. Nothing aggravates the symptoms. Treatments tried: Toviaz 4 mg and Trospium 20 mg. The treatment provided mild relief.     Review of Systems   Constitutional: Negative for appetite change, chills, diaphoresis, fatigue and fever.   Gastrointestinal: Positive for constipation (taking stool softner). Negative for abdominal pain, anorexia, change in bowel habit, diarrhea, nausea, vomiting, fecal incontinence and change in bowel habit.   Genitourinary: Positive for bladder incontinence (with urgency sometimes), difficulty urinating (straining sometimes), frequency, menstrual irregularity and urgency. Negative for decreased urine volume, dysuria, flank pain, hematuria, pelvic pain, vaginal bleeding, vaginal discharge, vaginal pain and vaginal dryness.        Nocturia x5   Musculoskeletal: Positive for back pain (chronic). Negative for arthralgias.   Neurological: Negative for dizziness, weakness and headaches.   Psychiatric/Behavioral: Negative.          Objective:      Physical Exam  Vitals signs and nursing note reviewed.   Constitutional:        General: She is not in acute distress.     Appearance: Normal appearance. She is well-developed and normal weight.   HENT:      Head: Normocephalic and atraumatic.   Eyes:      Pupils: Pupils are equal, round, and reactive to light.   Neck:      Musculoskeletal: Normal range of motion.   Cardiovascular:      Rate and Rhythm: Normal rate.   Pulmonary:      Effort: Pulmonary effort is normal. No respiratory distress.   Abdominal:      Palpations: Abdomen is soft.      Tenderness: There is no abdominal tenderness.   Genitourinary:     Comments: PVR = 44 mL  Musculoskeletal: Normal range of motion.   Skin:     General: Skin is warm and dry.   Neurological:      Mental Status: She is alert and oriented to person, place, and time.      Coordination: Coordination normal.   Psychiatric:         Behavior: Behavior normal.         Thought Content: Thought content normal.         Judgment: Judgment normal.         Assessment:       1. OAB (overactive bladder)    2. Nocturia    3. Urinary frequency    4. Urinary urgency    5. Urge urinary incontinence        Plan:       Fawn HEAD was seen today for follow-up, nocturia, urinary frequency and urinary urgency.    Diagnoses and all orders for this visit:    OAB (overactive bladder)  -     fesoterodine (TOVIAZ) 4 mg Tb24; Take 2 tablets (8 mg total) by mouth once daily.  -     POCT URINE DIPSTICK WITHOUT MICROSCOPE  -     Urine culture  -     POCT Bladder Scan    Nocturia  -     POCT URINE DIPSTICK WITHOUT MICROSCOPE  -     Urine culture  -     POCT Bladder Scan    Urinary frequency  -     POCT Bladder Scan    Urinary urgency  -     POCT Bladder Scan    Urge urinary incontinence    Other order  1. Increase Toviaz to 8 mg daily for overactive bladder.    Follow-up in 2 weeks with Virtual Visit.     Tiki Johnson NP

## 2020-06-17 LAB — BACTERIA UR CULT: NO GROWTH

## 2020-06-18 ENCOUNTER — TELEPHONE (OUTPATIENT)
Dept: UROLOGY | Facility: CLINIC | Age: 50
End: 2020-06-18

## 2020-06-18 NOTE — TELEPHONE ENCOUNTER
----- Message from Tiki Johnson NP sent at 6/18/2020  8:26 AM CDT -----  Please inform patient her urine cx was normal. She does not have a UTI.

## 2020-06-30 ENCOUNTER — OFFICE VISIT (OUTPATIENT)
Dept: UROLOGY | Facility: CLINIC | Age: 50
End: 2020-06-30
Payer: MEDICAID

## 2020-06-30 DIAGNOSIS — N32.81 OAB (OVERACTIVE BLADDER): Primary | ICD-10-CM

## 2020-06-30 PROCEDURE — 99212 PR OFFICE/OUTPT VISIT, EST, LEVL II, 10-19 MIN: ICD-10-PCS | Mod: 95,,, | Performed by: NURSE PRACTITIONER

## 2020-06-30 PROCEDURE — 99212 OFFICE O/P EST SF 10 MIN: CPT | Mod: 95,,, | Performed by: NURSE PRACTITIONER

## 2020-06-30 NOTE — PROGRESS NOTES
Established Patient - Audio Only Telehealth Visit     The patient location is: Home  The chief complaint leading to consultation is: F/U after increasing Toviaz dosage to 8 mg daily.   Visit type: Virtual visit with audio only (telephone)  Total time spent with patient: 5 minutes       The reason for the audio only service rather than synchronous audio and video virtual visit was related to technical difficulties or patient preference/necessity.     Each patient to whom I provide medical services by telemedicine is:  (1) informed of the relationship between the physician and patient and the respective role of any other health care provider with respect to management of the patient; and (2) notified that they may decline to receive medical services by telemedicine and may withdraw from such care at any time. Patient verbally consented to receive this service via voice-only telephone call.       HPI: Patient is present via audio only telephone encounter for a 2 weeks f/u since increasing her Toviaz from 4 mg to 8 mg daily. Patient reports that the medication increase is effective and greatly helping with her urinary symptoms.      Assessment and plan:      Fawn HEAD was seen today for other.    Diagnoses and all orders for this visit:    OAB (overactive bladder)  - Continue taking Toviaz 8 mg daily for OAB    Follow-up in 1 year and as needed.    Tiki Johnson NP           This service was not originating from a related E/M service provided within the previous 7 days nor will  to an E/M service or procedure within the next 24 hours or my soonest available appointment.  Prevailing standard of care was able to be met in this audio-only visit.

## 2020-07-28 DIAGNOSIS — N32.81 OAB (OVERACTIVE BLADDER): ICD-10-CM

## 2020-07-28 RX ORDER — FESOTERODINE FUMARATE 4 MG/1
8 TABLET, FILM COATED, EXTENDED RELEASE ORAL DAILY
Qty: 60 TABLET | Refills: 11 | Status: SHIPPED | OUTPATIENT
Start: 2020-07-28 | End: 2020-10-21 | Stop reason: SINTOL

## 2020-10-21 ENCOUNTER — TELEPHONE (OUTPATIENT)
Dept: UROLOGY | Facility: CLINIC | Age: 50
End: 2020-10-21

## 2020-10-21 DIAGNOSIS — N32.81 OAB (OVERACTIVE BLADDER): Primary | ICD-10-CM

## 2020-10-21 NOTE — TELEPHONE ENCOUNTER
Return telephone call placed to patient. She reports severe constipation, abdominal pain, gas, and bloating. Symptoms started 1 month ago. She's concern that her gastro symptoms might be side effects from her Toviaz. Patient is taking Toviaz 8 mg daily for her OAB. She has tried Trospium 20 mg BID in the past, but medication stopped working. She is currently taking a probiotic and has tried other OTC medications for constipation without success. Patient is scheduled to see Gastro on 11/8/2020.     Diagnoses and all orders for this visit:    OAB (overactive bladder)  -     mirabegron (MYRBETRIQ) 25 mg Tb24 ER tablet; Take 1 tablet (25 mg total) by mouth once daily.    Other order  1. Discontinue Toviaz 8 mg at this time.    Patient to f/u if new medication is not approved.     Tiki Johnson NP

## 2020-10-21 NOTE — TELEPHONE ENCOUNTER
Patient called in regards of wanting to talk with you in regards of a personal matter. Please advise.

## 2021-03-20 ENCOUNTER — IMMUNIZATION (OUTPATIENT)
Dept: PRIMARY CARE CLINIC | Facility: CLINIC | Age: 51
End: 2021-03-20
Payer: MEDICAID

## 2021-03-20 DIAGNOSIS — Z23 NEED FOR VACCINATION: Primary | ICD-10-CM

## 2021-03-20 PROCEDURE — 91300 PR SARS-COV- 2 COVID-19 VACCINE, NO PRSV, 30MCG/0.3ML, IM: CPT | Mod: S$GLB,,, | Performed by: INTERNAL MEDICINE

## 2021-03-20 PROCEDURE — 0001A PR IMMUNIZ ADMIN, SARS-COV-2 COVID-19 VACC, 30MCG/0.3ML, 1ST DOSE: ICD-10-PCS | Mod: CV19,S$GLB,, | Performed by: INTERNAL MEDICINE

## 2021-03-20 PROCEDURE — 91300 PR SARS-COV- 2 COVID-19 VACCINE, NO PRSV, 30MCG/0.3ML, IM: ICD-10-PCS | Mod: S$GLB,,, | Performed by: INTERNAL MEDICINE

## 2021-03-20 PROCEDURE — 0001A PR IMMUNIZ ADMIN, SARS-COV-2 COVID-19 VACC, 30MCG/0.3ML, 1ST DOSE: CPT | Mod: CV19,S$GLB,, | Performed by: INTERNAL MEDICINE

## 2021-03-20 RX ADMIN — Medication 0.3 ML: at 10:03

## 2021-04-06 ENCOUNTER — TELEPHONE (OUTPATIENT)
Dept: UROLOGY | Facility: CLINIC | Age: 51
End: 2021-04-06

## 2021-04-11 ENCOUNTER — IMMUNIZATION (OUTPATIENT)
Dept: PRIMARY CARE CLINIC | Facility: CLINIC | Age: 51
End: 2021-04-11
Payer: MEDICAID

## 2021-04-11 DIAGNOSIS — Z23 NEED FOR VACCINATION: Primary | ICD-10-CM

## 2021-04-11 PROCEDURE — 91300 PR SARS-COV- 2 COVID-19 VACCINE, NO PRSV, 30MCG/0.3ML, IM: CPT | Mod: S$GLB,,, | Performed by: INTERNAL MEDICINE

## 2021-04-11 PROCEDURE — 0002A PR IMMUNIZ ADMIN, SARS-COV-2 COVID-19 VACC, 30MCG/0.3ML, 2ND DOSE: CPT | Mod: CV19,S$GLB,, | Performed by: INTERNAL MEDICINE

## 2021-04-11 PROCEDURE — 0002A PR IMMUNIZ ADMIN, SARS-COV-2 COVID-19 VACC, 30MCG/0.3ML, 2ND DOSE: ICD-10-PCS | Mod: CV19,S$GLB,, | Performed by: INTERNAL MEDICINE

## 2021-04-11 PROCEDURE — 91300 PR SARS-COV- 2 COVID-19 VACCINE, NO PRSV, 30MCG/0.3ML, IM: ICD-10-PCS | Mod: S$GLB,,, | Performed by: INTERNAL MEDICINE

## 2021-04-11 RX ADMIN — Medication 0.3 ML: at 09:04

## 2021-06-15 DIAGNOSIS — R30.0 DYSURIA: Primary | ICD-10-CM

## 2021-06-15 DIAGNOSIS — R35.0 URINARY FREQUENCY: ICD-10-CM

## 2021-06-15 DIAGNOSIS — R30.0 DYSURIA: ICD-10-CM

## 2021-06-15 DIAGNOSIS — N32.81 OAB (OVERACTIVE BLADDER): Primary | ICD-10-CM

## 2021-06-15 RX ORDER — PHENAZOPYRIDINE HYDROCHLORIDE 200 MG/1
200 TABLET, FILM COATED ORAL 3 TIMES DAILY PRN
Qty: 9 TABLET | Refills: 0 | Status: SHIPPED | OUTPATIENT
Start: 2021-06-15 | End: 2021-06-18

## 2021-06-15 RX ORDER — SULFAMETHOXAZOLE AND TRIMETHOPRIM 400; 80 MG/1; MG/1
1 TABLET ORAL 2 TIMES DAILY
Qty: 20 TABLET | Refills: 0 | Status: SHIPPED | OUTPATIENT
Start: 2021-06-15 | End: 2021-06-25

## 2021-06-17 ENCOUNTER — LAB VISIT (OUTPATIENT)
Dept: LAB | Facility: HOSPITAL | Age: 51
End: 2021-06-17
Attending: NURSE PRACTITIONER
Payer: MEDICAID

## 2021-06-17 DIAGNOSIS — N32.81 OAB (OVERACTIVE BLADDER): ICD-10-CM

## 2021-06-17 DIAGNOSIS — R30.0 DYSURIA: ICD-10-CM

## 2021-06-17 DIAGNOSIS — R35.0 URINARY FREQUENCY: ICD-10-CM

## 2021-06-17 LAB
BILIRUB UR QL STRIP: NEGATIVE
CLARITY UR: CLEAR
COLOR UR: YELLOW
GLUCOSE UR QL STRIP: NEGATIVE
HGB UR QL STRIP: NEGATIVE
KETONES UR QL STRIP: NEGATIVE
LEUKOCYTE ESTERASE UR QL STRIP: NEGATIVE
NITRITE UR QL STRIP: NEGATIVE
PH UR STRIP: 7 [PH] (ref 5–8)
PROT UR QL STRIP: NEGATIVE
SP GR UR STRIP: 1.02 (ref 1–1.03)
URN SPEC COLLECT METH UR: NORMAL
UROBILINOGEN UR STRIP-ACNC: NEGATIVE EU/DL

## 2021-06-17 PROCEDURE — 81003 URINALYSIS AUTO W/O SCOPE: CPT | Performed by: NURSE PRACTITIONER

## 2021-10-27 ENCOUNTER — TELEPHONE (OUTPATIENT)
Dept: UROLOGY | Facility: CLINIC | Age: 51
End: 2021-10-27
Payer: MEDICAID

## 2021-10-27 DIAGNOSIS — N32.81 OAB (OVERACTIVE BLADDER): Primary | ICD-10-CM

## 2021-10-27 RX ORDER — SOLIFENACIN SUCCINATE 5 MG/1
5 TABLET, FILM COATED ORAL DAILY
Qty: 30 TABLET | Refills: 11 | Status: SHIPPED | OUTPATIENT
Start: 2021-10-27 | End: 2023-05-08

## 2022-10-13 DIAGNOSIS — M79.672 BILATERAL FOOT PAIN: Primary | ICD-10-CM

## 2022-10-13 DIAGNOSIS — M79.671 BILATERAL FOOT PAIN: Primary | ICD-10-CM

## 2022-10-18 ENCOUNTER — TELEPHONE (OUTPATIENT)
Dept: PODIATRY | Facility: CLINIC | Age: 52
End: 2022-10-18
Payer: MEDICAID

## 2022-10-18 ENCOUNTER — OFFICE VISIT (OUTPATIENT)
Dept: PODIATRY | Facility: CLINIC | Age: 52
End: 2022-10-18
Payer: MEDICAID

## 2022-10-18 VITALS
HEIGHT: 69 IN | HEART RATE: 83 BPM | RESPIRATION RATE: 18 BRPM | BODY MASS INDEX: 23.04 KG/M2 | WEIGHT: 155.56 LBS | DIASTOLIC BLOOD PRESSURE: 79 MMHG | SYSTOLIC BLOOD PRESSURE: 123 MMHG

## 2022-10-18 DIAGNOSIS — M21.611 BUNION OF RIGHT FOOT: ICD-10-CM

## 2022-10-18 DIAGNOSIS — M79.671 FOOT PAIN, BILATERAL: Primary | ICD-10-CM

## 2022-10-18 DIAGNOSIS — M79.89 PALPABLE MASS OF SOFT TISSUE OF FOOT: ICD-10-CM

## 2022-10-18 DIAGNOSIS — M79.672 FOOT PAIN, BILATERAL: Primary | ICD-10-CM

## 2022-10-18 PROCEDURE — 1159F PR MEDICATION LIST DOCUMENTED IN MEDICAL RECORD: ICD-10-PCS | Mod: CPTII,,, | Performed by: PODIATRIST

## 2022-10-18 PROCEDURE — 3074F PR MOST RECENT SYSTOLIC BLOOD PRESSURE < 130 MM HG: ICD-10-PCS | Mod: CPTII,,, | Performed by: PODIATRIST

## 2022-10-18 PROCEDURE — 3078F DIAST BP <80 MM HG: CPT | Mod: CPTII,,, | Performed by: PODIATRIST

## 2022-10-18 PROCEDURE — 99203 PR OFFICE/OUTPT VISIT, NEW, LEVL III, 30-44 MIN: ICD-10-PCS | Mod: 25,S$PBB,, | Performed by: PODIATRIST

## 2022-10-18 PROCEDURE — 99999 PR PBB SHADOW E&M-EST. PATIENT-LVL IV: CPT | Mod: PBBFAC,,, | Performed by: PODIATRIST

## 2022-10-18 PROCEDURE — 3008F PR BODY MASS INDEX (BMI) DOCUMENTED: ICD-10-PCS | Mod: CPTII,,, | Performed by: PODIATRIST

## 2022-10-18 PROCEDURE — 1159F MED LIST DOCD IN RCRD: CPT | Mod: CPTII,,, | Performed by: PODIATRIST

## 2022-10-18 PROCEDURE — 20600 DRAIN/INJ JOINT/BURSA W/O US: CPT | Mod: PBBFAC,PO,RT | Performed by: PODIATRIST

## 2022-10-18 PROCEDURE — 3074F SYST BP LT 130 MM HG: CPT | Mod: CPTII,,, | Performed by: PODIATRIST

## 2022-10-18 PROCEDURE — 20600 SMALL JOINT ASPIRATION/INJECTION: R GREAT MTP: ICD-10-PCS | Mod: S$PBB,RT,, | Performed by: PODIATRIST

## 2022-10-18 PROCEDURE — 1160F PR REVIEW ALL MEDS BY PRESCRIBER/CLIN PHARMACIST DOCUMENTED: ICD-10-PCS | Mod: CPTII,,, | Performed by: PODIATRIST

## 2022-10-18 PROCEDURE — 3078F PR MOST RECENT DIASTOLIC BLOOD PRESSURE < 80 MM HG: ICD-10-PCS | Mod: CPTII,,, | Performed by: PODIATRIST

## 2022-10-18 PROCEDURE — 99203 OFFICE O/P NEW LOW 30 MIN: CPT | Mod: 25,S$PBB,, | Performed by: PODIATRIST

## 2022-10-18 PROCEDURE — 99214 OFFICE O/P EST MOD 30 MIN: CPT | Mod: PBBFAC,PO | Performed by: PODIATRIST

## 2022-10-18 PROCEDURE — 3008F BODY MASS INDEX DOCD: CPT | Mod: CPTII,,, | Performed by: PODIATRIST

## 2022-10-18 PROCEDURE — 99999 PR PBB SHADOW E&M-EST. PATIENT-LVL IV: ICD-10-PCS | Mod: PBBFAC,,, | Performed by: PODIATRIST

## 2022-10-18 PROCEDURE — 1160F RVW MEDS BY RX/DR IN RCRD: CPT | Mod: CPTII,,, | Performed by: PODIATRIST

## 2022-10-18 RX ADMIN — DEXAMETHASONE SODIUM PHOSPHATE 4 MG: 4 INJECTION, SOLUTION INTRAMUSCULAR; INTRAVENOUS at 10:10

## 2022-10-18 NOTE — PROGRESS NOTES
[facility] PODIATRY  [address] [place] [address], [address]  [facility] [address]  Dept: [phone]  Dept Fax: [phone]    John Hercules Jr., DPM     Assessment:   MDM    Coding  1. Foot pain, bilateral        2. Bunion of right foot  Small Joint Aspiration/Injection: R great MTP      3. Palpable mass of soft tissue of foot            Plan:     Small Joint Aspiration/Injection: R great MTP    Date/Time: 10/18/2022 10:14 AM  Performed by: John Hercules Jr., DPM  Authorized by: John Hercules Jr., DPM     Consent Done?:  Yes (Verbal)  Indications:  Arthritis, pain and joint swelling  Site marked: the procedure site was marked    Timeout: prior to procedure the correct patient, procedure, and site was verified    Prep: patient was prepped and draped in usual sterile fashion      Local anesthesia used?: Yes    Anesthesia:  Local infiltration  Local anesthetic:  Bupivacaine 0.25% without epinephrine  Anesthetic total (ml):  2    Location:  Great toe  Site:  R great MTP  Ultrasonic guidance for needle placement?: No    Needle size:  25 G  Approach:  Dorsal  Medications:  4 mg dexAMETHasone 4 mg/mL  Patient tolerance:  Patient tolerated the procedure well with no immediate complications    Fawn HEAD was seen today for foot problem.    Diagnoses and all orders for this visit:    Foot pain, bilateral    Bunion of right foot  -     Small Joint Aspiration/Injection: R great MTP    Palpable mass of soft tissue of foot      -pt seen, evaluated, and managed  -dx discussed in detail. All questions/concerns addressed  -all tx options discussed. All alternatives, risks, benefits of all txs discussed  -We discussed conservative care options possible including but not limited to shoe wear and/or padding, bracing/strapping, at home ROM, formal PT, medical therapy, injection therapy  - The utilization of NSAIDs can be considered but their benefit has to be tempered against the risk of GI/ concerns  - A steroid  injection can be undertaken.  We did discuss the potential mechanism of action of this shot.  Understanding that multiple injections at the same anatomic site do have deleterious effects on the soft tissue.  Generic risks include: steroid flare (advised to ice if necessary), skin hypo-pgimentation (which can be permanent and unsightly), elevation of blood sugar, subcutaneous atrophy (can be permanent) and infection.   -XR/imaging reviewed by me: agree with read  -implemented icing/stretching regimen    -rxs dispensed: none  -referrals: none  -WB: wbat      Follow up in about 4 weeks (around 11/15/2022).    Subjective:      Patient ID: Fawn Waters is a 52 y.o. female.    Chief Complaint:   Chief Complaint   Patient presents with    Foot Problem     Right foot bunion        Patient complains of right bunions. Symptoms began a few month ago. Notes progressive deformity of the right great toe.  Complains of associated pain.  Severity = moderate  Notes excessive wear on shoes.  Symptoms having impact on normal day to day activities. Patient's symptoms have gradually worsened. Treatment thus far has included trial of new shoes, which have been ineffective.  OTC inserts, which have been ineffective.  OTC analgesics, which have been somewhat effective.. Aggravating factors: walking and certain shoegear. Evaluation to date: none. Patients rates pain 8/10 on pain scale.      HPI    Last Podiatry Enc: Visit date not found  Last Enc w/ Me: Visit date not found    Outside reports reviewed: historical medical records.  Family hx: as below  Past Medical History:   Diagnosis Date    Asthma     Depression     Urinary tract infection     Vaginal infection      Past Surgical History:   Procedure Laterality Date    TUBAL LIGATION       Family History   Problem Relation Age of Onset    Breast cancer Mother     Kidney disease Neg Hx      Current Outpatient Medications   Medication Sig Dispense Refill    albuterol  (PROVENTIL/VENTOLIN HFA) 90 mcg/actuation inhaler       cetirizine (ZYRTEC) 10 MG tablet       diclofenac (VOLTAREN) 75 MG EC tablet TK 1 T PO BID  3    fluoxetine (PROZAC) 20 MG capsule       fluticasone (FLONASE) 50 mcg/actuation nasal spray       gabapentin (NEURONTIN) 300 MG capsule       ibuprofen (ADVIL,MOTRIN) 800 MG tablet Take 1 tablet (800 mg total) by mouth every 6 (six) hours as needed for Pain. 20 tablet 0    methylPREDNISolone (MEDROL DOSEPACK) 4 mg tablet TK UTD  0    omeprazole (PRILOSEC) 20 MG capsule       solifenacin (VESICARE) 5 MG tablet Take 1 tablet (5 mg total) by mouth once daily. 30 tablet 11     No current facility-administered medications for this visit.     Review of patient's allergies indicates:   Allergen Reactions    Codeine      Pt states she is in recovery and cannot take any narcotic medications     Social History     Socioeconomic History    Marital status: Single   Tobacco Use    Smoking status: Former     Types: Vaping with nicotine     Quit date: 2017     Years since quittin.7    Smokeless tobacco: Never   Substance and Sexual Activity    Alcohol use: No    Drug use: No    Sexual activity: Yes     Partners: Male       ROS    REVIEW OF SYSTEMS: Negative as documented below as well as positive findings in bold.       Constitutional  Respiratory  Gastrointestinal  Skin   - Fever - Cough - Heartburn - Rash   - Chills - Spit blood - Nausea - Itching   - Weight Loss - Shortness of breath - Vomiting - Nail pain   - Malaise/Fatigue - Wheezing - Abdominal Pain  Wound/Ulcer   - Weight Gain   - Blood in Stool  Poor wound healing       - Diarrhea          Cardiovascular  Genitourinary  Neurological  HEENT   - Chest Pain - Dysuria - Burning Sensation of feet - Headache   - Palpitations - Hematuria - Tingling / Paresthesia - Congestion   - Pain at night in legs - Flank Pain - Dizziness - Sore Throat   - Cramping   - Tremor - Blurred Vision   - Leg Swelling   -  "Sensory Change - Double Vision   - Dizzy when standing   - Speech Change - Eye Redness       - Focal Weakness - Dry Eyes       - Loss of Consciousness          Endocrine  Musculoskeletal  Psychiatric   - Cold intolerance - Muscle Pain - Depression   - Heat intolerance - Neck Pain - Insomnia   - Anemia - Joint Pain - Memory Loss   -  Easy bruising, bleeding - Heel pain - Anxiety      Toe Pain        Leg/Ankle/Foot Pain         Objective:     /79 (BP Location: Right arm, Patient Position: Sitting, BP Method: X-Large (Automatic))   Pulse 83   Resp 18   Ht 5' 9" (1.753 m)   Wt 70.5 kg (155 lb 8.6 oz)   BMI 22.97 kg/m²   Vitals:    10/18/22 0909   BP: 123/79   Pulse: 83   Resp: 18   Weight: 70.5 kg (155 lb 8.6 oz)   Height: 5' 9" (1.753 m)       Physical Exam    General Appearance:   Patient appears well developed, well nourished  Patient appears stated age    Psychiatric:   Patient is oriented to time, place, and person.  Patient has appropriate mood and affect    Neck:  Trachea Midline  No visible masses    Respiratory/Ears:  No distress or labored breathing.  Able to differentiate between normal talking voice and whisper.  Able to follow commands    Eyes:  Visual Acuity intact  Lids and conjunctivae normal. No discoloration noted.    Foot Exam  Physical Exam  Ortho Exam  Ortho/SPM Exam  Physical Exam  Neurologic Exam    R LE exam con't:  V:  DP 2/4, PT 2/4   CRT< 3s to all digits tested   Tibial and popliteal lymph nodes are w/o abnormality    Edema: absent, varicosities: absent    N:  Patient displays normal ankle reflexes   SILT in SP/DP/T/Yandel/Saph distributions    Ortho: +Motor EHL/FHL/TA/GA   observed mild lateral deviation of the hallux with bunion deformity present R and +midfoot instability noted    Enlarged bony prominence present at the dorsal aspect of the 1st metatarsal head R foot   +soft tissue mass medial plantar foot at heel: firm, well circumscribed   There is mild decreased ROM at the first " MTP joints with pain and w/o crepitus.   There is moderate pain with palpation of dorsal bump of the R 1st MPJ  Compartments soft/compressible. No pain on passive stretch of big toe. No calf  Pain.    Derm:  skin intact, skin warm and dry, skin without ulcers or lesions, skin without induration, nails normal      Imaging / Labs:      No results found.      Note: This was dictated using a computer transcription program. Although proofread, it may contain computer transcription errors and phonetic errors. Other human proofreading errors may also exist. Corrections may be performed at a later time. Please contact us for any clarification if needed.    John Hercules DPM  [facility]

## 2022-10-18 NOTE — TELEPHONE ENCOUNTER
Called and spoke to patient to inform Dr Hercules has reviewed  X-ray results showed  No acute Abnormalities .  Patient verbalized understanding the test results.

## 2022-10-18 NOTE — TELEPHONE ENCOUNTER
----- Message from John Hercules Jr., DPM sent at 10/18/2022 11:51 AM CDT -----  Reviewed result. Please call patient to inform them result has no acute abnormalities.

## 2022-10-18 NOTE — PATIENT INSTRUCTIONS
Bunions  Even though bunions are a common foot deformity, there are misconceptions about them. Many people may unnecessarily suffer the pain of bunions for years before seeking treatment.    What Is a Bunion?          A bunion (also referred to as hallux valgus) is often described as a bump on the side of the big toe. But a bunion is more than that. The visible bump actually reflects changes in the bony framework of the front part of the foot. The big toe leans toward the second toe, rather than pointing straight ahead. This throws the bones out of alignment--producing the bunions bump.        Bunions are a progressive disorder. They begin with a leaning of the big toe, gradually changing the angle of the bones over the years and slowly producing the characteristic bump, which becomes increasingly prominent. Symptoms usually appear at later stages, although some people never have symptoms.    Causes  Bunions are most often caused by an inherited faulty mechanical structure of the foot. It is not the bunion itself that is inherited but certain foot types that make a person prone to developing a bunion.    Although wearing shoes that crowd the toes will not actually cause bunions, it sometimes makes the deformity get progressively worse. Symptoms may therefore appear sooner.    Symptoms  Symptoms, which occur at the site of the bunion, may include:    Pain or soreness  Inflammation and redness  A burning sensation  Possible numbness    Symptoms occur most often when wearing shoes that crowd the toes, such as shoes with a tight toe box or high heels. This may explain why women are more likely to have symptoms than men. In addition, spending long periods of time on your feet can aggravate the symptoms of bunions.    Diagnosis  Diagram indicating location of bunion on a footBunions are readily apparent--the prominence is visible at the base of the big toe or side of the foot. However, to fully evaluate the condition,  the foot and ankle surgeon may take x-rays to determine the degree of the deformity and assess the changes that have occurred.    Because bunions are progressive, they do not go away and will usually get worse over time. But not all cases are alike--some bunions progress more rapidly than others. Once your surgeon has evaluated your bunion, a treatment plan can be developed that is suited to your needs.    Nonsurgical Treatment  Sometimes observation of the bunion is all that is needed. To reduce the chance of damage to the joint, periodic evaluation and x-rays by your surgeon are advised.    In many other cases, however, some type of treatment is needed. Early treatments are aimed at easing the pain of bunions, but they will not reverse the deformity itself. These include:    Changes in shoewear. Wearing the right kind of shoes is very important. Choose shoes that have a wide toe box and forgo those with pointed toes or high heels, which may aggravate the condition.  Padding. Pads placed over the area of the bunion can help minimize pain. These can be obtained from your surgeon or purchased at a drug store.  Activity modifications. Avoid activity that causes bunion pain, including standing for long periods of time.  Medications. Oral nonsteroidal anti-inflammatory drugs (NSAIDs), such as ibuprofen, may be recommended to reduce pain and inflammation.  Icing. Applying an ice pack several times a day helps reduce inflammation and pain.  Injection therapy. Although rarely used in bunion treatment, injections of corticosteroids may be useful in treating the inflamed bursa (fluid-filled sac located around a joint) sometimes seen with bunions.  Orthotic devices. In some cases, custom orthotic devices may be provided by the foot and ankle surgeon.      Recommended OTC orthotics:  -powerstep  -superfeet    Recommended shoegear:  -new balance  -ascics  -mizuno  -barboza       When Is Surgery Needed?  If nonsurgical treatments  fail to relieve bunion pain and when the pain of a bunion interferes with daily activities, it is time to discuss surgical options with a foot and ankle surgeon. Together you can decide if surgery is best for you.    A variety of surgical procedures is available to treat bunions. The procedures are designed to remove the bump of bone, correct the changes in the bony structure of the foot and correct soft tissue changes that may also have occurred. The goal of surgery is the reduction of pain and deformity.    In selecting the procedure or combination of procedures for your particular case, the foot and ankle surgeon will take into consideration the extent of your deformity based on the x-ray findings, your age, your activity level and other factors. The length of the recovery period will vary, depending on the procedure or procedures performed.                                          Bunion    You have a bunion. This is a bony bump at the base of your big toe, along the inside edge of your foot. As the bump gets bigger, it can become red, swollen, and painful with shoe wear.  Bunions may occur if you wear shoes that are too tight and pinch your toes together. High heels may make this worse. In some cases a bunion is due to poor alignment of the foot and ankle. This puts extra weight on the instep of each foot.  Once a bunion forms, it changes the way weight is spread all across your foot. This causes the bunion to get worse over time. The big toe will bend more and more toward the other toes.  A minor bunion can be treated by:  Wearing properly fitting shoes  Using bunion pads  Wearing shoe inserts, called orthotics, to better align the foot and ankle  Physical therapy with ultrasound or whirlpool baths can ease pain, redness, and swelling. Severe cases may require surgery. If you dont treat what is causing the bunion, it may get larger and more painful.  Home care  Limit high heels. These shoes force your foot  forward, crowding the toes together.  Switch to comfortable shoes with a wide toe area. Or have your existing shoes stretched by a shoe repair shop.  Avoid shoes that are tight, narrow, or pointed.  If you are flat-footed, using arch supports may help prevent further deformity. The best shoe inserts are the ones custom made by a foot specialist, called a podiatrist, or other healthcare provider.  Put a bunion pad over the bunion to ease pressure of your shoe against the bunion. You can buy these pads at most pharmacies without a prescription  To reduce pain and swelling, apply an ice pack over the injured area for 15 to 20 minutes. Do this every 1 to 2 hours the first day. Keep using ice 3 to 4 times a day until the pain and swelling goes away.  To make an ice pack, put ice cubes in a sealed zip-lock plastic bag. Wrap the bag in a clean, thin towel or cloth. Never put ice or an ice pack directly on the skin.  You may use over-the-counter pain medicine to control pain, unless another medicine was prescribed. Talk with your provider before using these medicines if you have chronic liver or kidney disease, or ever had a stomach ulcer or GI (gastrointestinal) bleeding.    Follow-up care  Follow up with a podiatrist or foot doctor, or as advised.  If X-rays were taken, you will be notified of any new findings that may affect your care.  When to seek medical care  Contact your healthcare provider if any of the following occur:  Increasing pain or redness around the base of the big toe  Painful ingrown toenail, with redness and swelling or pus around the nail  Date Last Reviewed: 11/21/2015  © 5034-3457 [employer]. [address] [place] [address]. All rights reserved. This information is not intended as a substitute for professional medical care. Always follow your healthcare professional's instructions.        Treating Bunions  Although a bunion wont go away, wearing shoes that fit properly will  "often relieve the pain. Padding and icing the bunion may also help. Bunions that remain painful may need surgery.     Heels: Heel height should be low. The back of the shoe should  your heel firmly so the shoe doesn't flop when you walk.         Toes: There should be 1/2" between your longest toe and the tip of the shoe. The shoe should be wide enough for you to wiggle your toes.    Shoes  To relieve a bunion, you dont have to buy shoes that are ugly or out of fashion. But follow these tips:  Shop for shoes late in the day. This is when your feet are the largest.  Have both feet measured often. Fit shoes to your larger foot.  Look for shoes that have the same shape as your foot but are slightly wider across the toes.  Choose low-heeled shoes.  Always try shoes on. Stand up and walk around. If the shoes arent comfortable, dont buy them.  Ice massage  To help relieve a painful bunion, put an ice cube in a plastic bag. Rub the ice on the bunion for 5 minutes. Repeat 2 to 3 times a day.  Pads  You may want to put a pad over the bunion to cushion it. You can buy bunion pads at most [facility].  Surgery  Wearing wider shoes and padding the bunion may not relieve the pain. Your healthcare provider may then suggest surgery. During surgery, the bunion is shaved away and the bones are put back in a straight line.   Date Last Reviewed: 9/27/2015 © 2000-2016 [employer]. [address] [place] [address], [place] [address]. All rights reserved. This information is not intended as a substitute for professional medical care. Always follow your healthcare professional's instructions.      Osteotomy and Ligament or Tendon Repair (Bunion Surgery)  Osteotomy and ligament or tendon repair is a type of bunion surgery. A bunion is a bony bump (growth) at the base of your big toe. This growth can form when your big toe pushes against your next toe. A bunion can cause pain, swelling, redness, and other symptoms. During this " surgery, bone is removed from your toe. Nearby tendons and ligaments are made shorter or longer as needed. This allows your big toe to line up (align) properly.    Preparing for surgery  Follow any instructions from your healthcare provider.  Tell your surgeon about any medicines you are taking. You may need to stop taking all or some of these before the procedure. This includes:  All prescription medicines  Over-the-counter medicines such as aspirin or ibuprofen  Street drugs  Herbs, vitamins, and other supplements  Also, follow any directions youre given for not eating or drinking before surgery.  The day of surgery  The surgery takes at least 60 minutes. You will likely go home the same day.  Before the surgery begins  An IV (intravenous) line is put into a vein in your arm or hand. This line gives you fluids and medicines.  You may be given medicine to help you relax (sedation). To keep you free of pain during the surgery, you may have medicine to block the nerves in your foot. Or, you will be given general anesthesia. This puts you into a deep sleep.  During the surgery  A cut (incision) is made on your foot to expose the bunion bump, and the tendons and ligaments around it. The tendons and ligaments that are tight are cut (released).  The bunion bump is removed with a bone saw. Your big toe bone or the main bone in your foot is shortened and realigned. A pin, screw, or plate is used to hold your toe and foot bones together.  The nearby tendons and ligaments may be tightened. If there is extra tissue, it is removed and the ends are stitched (sutured) together. The incision in your skin is then closed with sutures. Your foot is bandaged.  After the surgery  Youll be taken to a recovery room. You may have medicines to manage pain. You may wear a brace, surgical shoe, or cast to protect your foot while it heals. The surgeon will tell you when you can go home. Have an adult family member or friend drive  you.  Recovering at home  Once home, follow any instructions you are given. During your recovery:  Take pain medicine exactly as directed.  To prevent swelling, sit or lie with your leg raised on one or more pillows. Do this for the first 2 days.  Follow your surgeon's instructions about putting weight on your foot after the surgery. You may need to use a walker, cane, or crutches for a time.  You may wear a brace, surgical shoe, or cast for up to a month or longer. Care for this as instructed. Keep it dry by wrapping it in plastic bags when bathing.  Avoid sports and other activities until your surgeon says its OK.  Care for your incision as instructed.  Don't drive until your surgeon says its OK.  Call your surgeon if you have any of the following:  Chest pain or trouble breathing  Fever of 100.4°F (38°C) or higher, or as directed by your surgeon  Pain that isnt helped by medicine or rest  Increased swelling not helped by raising or icing your foot  Signs of infection at any incision site, such as increased redness or swelling, warmth, more pain, or bad-smelling drainage  Bleeding through the bandages  Symptoms of poor circulation, such as toes that look blue instead of pinkish  Numbness that doesnt go away  Any other signs or symptoms indicated by your surgeon   Follow-up  Keep all follow-up appointments with your surgeon. These are to check that you are healing well from the surgery. You may have X-rays to check how the bone is healing. Physical therapy, foot exercises, and other treatments may be discussed at follow-up visits. Full recovery can take at least a few months.  Risks and possible complications include:  Infection  Bleeding  Sensitivity at the incision site for months after the surgery  Foot pain that doesnt go away after surgery  Numbness in the foot  Only partial relief of symptoms, or no relief of symptoms  Return of the bunion  Risks of anesthesia (the anesthesiologist will discuss these  with you)  Poor wound healing  Breakage of screws or pins  A lot of scarring   Date Last Reviewed: 7/28/2015  © 4567-5029 [employer]. [address] [place] [address], [place] [address]. All rights reserved. This information is not intended as a substitute for professional medical care. Always follow your healthcare professional's instructions.

## 2022-10-26 ENCOUNTER — PATIENT MESSAGE (OUTPATIENT)
Dept: UROLOGY | Facility: CLINIC | Age: 52
End: 2022-10-26
Payer: MEDICAID

## 2022-11-07 RX ORDER — DEXAMETHASONE SODIUM PHOSPHATE 4 MG/ML
4 INJECTION, SOLUTION INTRA-ARTICULAR; INTRALESIONAL; INTRAMUSCULAR; INTRAVENOUS; SOFT TISSUE
Status: DISCONTINUED | OUTPATIENT
Start: 2022-10-18 | End: 2022-11-07 | Stop reason: HOSPADM

## 2022-11-16 ENCOUNTER — OFFICE VISIT (OUTPATIENT)
Dept: PODIATRY | Facility: CLINIC | Age: 52
End: 2022-11-16
Payer: MEDICAID

## 2022-11-16 VITALS
RESPIRATION RATE: 18 BRPM | BODY MASS INDEX: 22.96 KG/M2 | HEIGHT: 69 IN | WEIGHT: 155 LBS | DIASTOLIC BLOOD PRESSURE: 94 MMHG | SYSTOLIC BLOOD PRESSURE: 145 MMHG | HEART RATE: 83 BPM

## 2022-11-16 DIAGNOSIS — M21.611 BUNION OF RIGHT FOOT: Primary | ICD-10-CM

## 2022-11-16 DIAGNOSIS — M79.89 PALPABLE MASS OF SOFT TISSUE OF FOOT: ICD-10-CM

## 2022-11-16 PROCEDURE — 3077F PR MOST RECENT SYSTOLIC BLOOD PRESSURE >= 140 MM HG: ICD-10-PCS | Mod: CPTII,,, | Performed by: PODIATRIST

## 2022-11-16 PROCEDURE — 3080F DIAST BP >= 90 MM HG: CPT | Mod: CPTII,,, | Performed by: PODIATRIST

## 2022-11-16 PROCEDURE — 1160F RVW MEDS BY RX/DR IN RCRD: CPT | Mod: CPTII,,, | Performed by: PODIATRIST

## 2022-11-16 PROCEDURE — 3008F PR BODY MASS INDEX (BMI) DOCUMENTED: ICD-10-PCS | Mod: CPTII,,, | Performed by: PODIATRIST

## 2022-11-16 PROCEDURE — 99213 PR OFFICE/OUTPT VISIT, EST, LEVL III, 20-29 MIN: ICD-10-PCS | Mod: S$PBB,,, | Performed by: PODIATRIST

## 2022-11-16 PROCEDURE — 3080F PR MOST RECENT DIASTOLIC BLOOD PRESSURE >= 90 MM HG: ICD-10-PCS | Mod: CPTII,,, | Performed by: PODIATRIST

## 2022-11-16 PROCEDURE — 3008F BODY MASS INDEX DOCD: CPT | Mod: CPTII,,, | Performed by: PODIATRIST

## 2022-11-16 PROCEDURE — 3077F SYST BP >= 140 MM HG: CPT | Mod: CPTII,,, | Performed by: PODIATRIST

## 2022-11-16 PROCEDURE — 99214 OFFICE O/P EST MOD 30 MIN: CPT | Mod: PBBFAC,PO | Performed by: PODIATRIST

## 2022-11-16 PROCEDURE — 1159F MED LIST DOCD IN RCRD: CPT | Mod: CPTII,,, | Performed by: PODIATRIST

## 2022-11-16 PROCEDURE — 1160F PR REVIEW ALL MEDS BY PRESCRIBER/CLIN PHARMACIST DOCUMENTED: ICD-10-PCS | Mod: CPTII,,, | Performed by: PODIATRIST

## 2022-11-16 PROCEDURE — 99999 PR PBB SHADOW E&M-EST. PATIENT-LVL IV: CPT | Mod: PBBFAC,,, | Performed by: PODIATRIST

## 2022-11-16 PROCEDURE — 99999 PR PBB SHADOW E&M-EST. PATIENT-LVL IV: ICD-10-PCS | Mod: PBBFAC,,, | Performed by: PODIATRIST

## 2022-11-16 PROCEDURE — 99213 OFFICE O/P EST LOW 20 MIN: CPT | Mod: S$PBB,,, | Performed by: PODIATRIST

## 2022-11-16 PROCEDURE — 1159F PR MEDICATION LIST DOCUMENTED IN MEDICAL RECORD: ICD-10-PCS | Mod: CPTII,,, | Performed by: PODIATRIST

## 2022-11-16 RX ORDER — DICLOFENAC SODIUM 10 MG/G
2 GEL TOPICAL 4 TIMES DAILY
Qty: 100 G | Refills: 2 | Status: SHIPPED | OUTPATIENT
Start: 2022-11-16 | End: 2022-11-26

## 2022-11-16 NOTE — PROGRESS NOTES
[facility] - PODIATRY  [address] [place] [address], [address]  [facility] [address]  Dept: [phone]  Dept Fax: [phone]    John Hercules Jr., SULLY     Assessment:   MDM    Coding  1. Bunion of right foot  diclofenac sodium (VOLTAREN) 1 % Gel      2. Palpable mass of soft tissue of foot  diclofenac sodium (VOLTAREN) 1 % Gel          Plan:     Procedures    Fawn HEAD was seen today for foot problem.    Diagnoses and all orders for this visit:    Bunion of right foot  -     diclofenac sodium (VOLTAREN) 1 % Gel; Apply 2 g topically 4 (four) times daily. for 10 days    Palpable mass of soft tissue of foot  -     diclofenac sodium (VOLTAREN) 1 % Gel; Apply 2 g topically 4 (four) times daily. for 10 days      -pt seen, evaluated, and managed  -dx discussed in detail. All questions/concerns addressed  -all tx options discussed. All alternatives, risks, benefits of all txs discussed  -We discussed conservative care options possible including but not limited to shoe wear and/or padding, bracing/strapping, at home ROM, formal PT, medical therapy, injection therapy  - The utilization of NSAIDs can be considered but their benefit has to be tempered against the risk of GI/ concerns  - A steroid injection can be undertaken.  We did discuss the potential mechanism of action of this shot.  Understanding that multiple injections at the same anatomic site do have deleterious effects on the soft tissue.  Generic risks include: steroid flare (advised to ice if necessary), skin hypo-pgimentation (which can be permanent and unsightly), elevation of blood sugar, subcutaneous atrophy (can be permanent) and infection.   -XR/imaging reviewed by me: agree with read  -cont icing/stretching regimen    -rxs dispensed: vgel  -referrals: none  -WB: wbat      Follow up if symptoms worsen or fail to improve.    Subjective:      Patient ID: Fawn Waters is a 52 y.o. female.    Chief Complaint:   Chief Complaint   Patient  presents with    Foot Problem     Right foot pain       Patient complains of right bunions. Symptoms began a few month ago. Notes progressive deformity of the right great toe.  Complains of associated pain.  Severity = moderate  Notes excessive wear on shoes.  Symptoms having impact on normal day to day activities. Patient's symptoms have gradually worsened. Treatment thus far has included trial of new shoes, which have been ineffective.  OTC inserts, which have been ineffective.  OTC analgesics, which have been somewhat effective.. Aggravating factors: walking and certain shoegear. Evaluation to date: none. Patients rates pain 8/10 on pain scale.      11/16/22:  Hx as above. Dex inj improved pain. Doing better.       Last Podiatry Enc: Visit date not found  Last Enc w/ Me: Visit date not found    Outside reports reviewed: historical medical records.  Family hx: as below  Past Medical History:   Diagnosis Date    Asthma     Depression     Urinary tract infection     Vaginal infection      Past Surgical History:   Procedure Laterality Date    TUBAL LIGATION       Family History   Problem Relation Age of Onset    Breast cancer Mother     Kidney disease Neg Hx      Current Outpatient Medications   Medication Sig Dispense Refill    albuterol (PROVENTIL/VENTOLIN HFA) 90 mcg/actuation inhaler       cetirizine (ZYRTEC) 10 MG tablet       diclofenac (VOLTAREN) 75 MG EC tablet TK 1 T PO BID  3    fluoxetine (PROZAC) 20 MG capsule       fluticasone (FLONASE) 50 mcg/actuation nasal spray       gabapentin (NEURONTIN) 300 MG capsule       ibuprofen (ADVIL,MOTRIN) 800 MG tablet Take 1 tablet (800 mg total) by mouth every 6 (six) hours as needed for Pain. 20 tablet 0    methylPREDNISolone (MEDROL DOSEPACK) 4 mg tablet TK UTD  0    omeprazole (PRILOSEC) 20 MG capsule       diclofenac sodium (VOLTAREN) 1 % Gel Apply 2 g topically 4 (four) times daily. for 10 days 100 g 2    solifenacin (VESICARE) 5 MG tablet Take 1 tablet (5 mg  "total) by mouth once daily. 30 tablet 11     No current facility-administered medications for this visit.     Review of patient's allergies indicates:   Allergen Reactions    Codeine      Pt states she is in recovery and cannot take any narcotic medications     Social History     Socioeconomic History    Marital status: Single   Tobacco Use    Smoking status: Former     Types: Vaping with nicotine     Quit date: 2017     Years since quittin.7    Smokeless tobacco: Never   Substance and Sexual Activity    Alcohol use: No    Drug use: No    Sexual activity: Yes     Partners: Male       ROS    REVIEW OF SYSTEMS: Negative as documented below as well as positive findings in bold.       Constitutional  Respiratory  Gastrointestinal  Skin   - Fever - Cough - Heartburn - Rash   - Chills - Spit blood - Nausea - Itching   - Weight Loss - Shortness of breath - Vomiting - Nail pain   - Malaise/Fatigue - Wheezing - Abdominal Pain  Wound/Ulcer   - Weight Gain   - Blood in Stool  Poor wound healing       - Diarrhea          Cardiovascular  Genitourinary  Neurological  HEENT   - Chest Pain - Dysuria - Burning Sensation of feet - Headache   - Palpitations - Hematuria - Tingling / Paresthesia - Congestion   - Pain at night in legs - Flank Pain - Dizziness - Sore Throat   - Cramping   - Tremor - Blurred Vision   - Leg Swelling   - Sensory Change - Double Vision   - Dizzy when standing   - Speech Change - Eye Redness       - Focal Weakness - Dry Eyes       - Loss of Consciousness          Endocrine  Musculoskeletal  Psychiatric   - Cold intolerance - Muscle Pain - Depression   - Heat intolerance - Neck Pain - Insomnia   - Anemia - Joint Pain - Memory Loss   -  Easy bruising, bleeding - Heel pain - Anxiety      Toe Pain        Leg/Ankle/Foot Pain         Objective:     BP (!) 145/94 (BP Location: Right arm, Patient Position: Sitting, BP Method: Large (Automatic))   Pulse 83   Resp 18   Ht 5' 9" (1.753 m)   Wt 70.3 kg (154 " "lb 15.7 oz)   BMI 22.89 kg/m²   Vitals:    11/16/22 1531   BP: (!) 145/94   Pulse: 83   Resp: 18   Weight: 70.3 kg (154 lb 15.7 oz)   Height: 5' 9" (1.753 m)   PainSc: 0-No pain       Physical Exam    General Appearance:   Patient appears well developed, well nourished  Patient appears stated age    Psychiatric:   Patient is oriented to time, place, and person.  Patient has appropriate mood and affect    Neck:  Trachea Midline  No visible masses    Respiratory/Ears:  No distress or labored breathing.  Able to differentiate between normal talking voice and whisper.  Able to follow commands    Eyes:  Visual Acuity intact  Lids and conjunctivae normal. No discoloration noted.    Foot Exam  Physical Exam  Ortho Exam  Ortho/SPM Exam  Physical Exam  Neurologic Exam    R LE exam con't:  V:  DP 2/4, PT 2/4   CRT< 3s to all digits tested   Tibial and popliteal lymph nodes are w/o abnormality    Edema: absent, varicosities: absent    N:  Patient displays normal ankle reflexes   SILT in SP/DP/T/Yandel/Saph distributions    Ortho: +Motor EHL/FHL/TA/GA   observed mild lateral deviation of the hallux with bunion deformity present R and +midfoot instability noted    Enlarged bony prominence present at the dorsal aspect of the 1st metatarsal head R foot   +soft tissue mass medial plantar foot at heel: firm, well circumscribed   There is mild decreased ROM at the first MTP joints with pain and w/o crepitus.   There is moderate pain with palpation of dorsal bump of the R 1st MPJ  Compartments soft/compressible. No pain on passive stretch of big toe. No calf  Pain.    Derm:  skin intact, skin warm and dry, skin without ulcers or lesions, skin without induration, nails normal      Imaging / Labs:      X-Ray Foot Complete 3 view Bilateral    Result Date: 10/18/2022  EXAMINATION: XR FOOT COMPLETE 3 VIEW BILATERAL CLINICAL HISTORY: Pain in right foot TECHNIQUE: AP, lateral, and oblique views of both feet were performed. COMPARISON: " 11/18/2019 FINDINGS: Right: There is no radiographic evidence of acute osseous, articular, or soft tissue abnormality.  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  No osseous erosions visualized. Left: There is no radiographic evidence of acute osseous, articular, or soft tissue abnormality.  Joint spaces are preserved.     As above.  No acute findings. Electronically signed by: Paramjit Jon MD Date:    10/18/2022 Time:    10:55         Note: This was dictated using a computer transcription program. Although proofread, it may contain computer transcription errors and phonetic errors. Other human proofreading errors may also exist. Corrections may be performed at a later time. Please contact us for any clarification if needed.    John Hercules DPM  [facility]

## 2022-11-16 NOTE — PATIENT INSTRUCTIONS
Bunions  Even though bunions are a common foot deformity, there are misconceptions about them. Many people may unnecessarily suffer the pain of bunions for years before seeking treatment.    What Is a Bunion?          A bunion (also referred to as hallux valgus) is often described as a bump on the side of the big toe. But a bunion is more than that. The visible bump actually reflects changes in the bony framework of the front part of the foot. The big toe leans toward the second toe, rather than pointing straight ahead. This throws the bones out of alignment--producing the bunions bump.        Bunions are a progressive disorder. They begin with a leaning of the big toe, gradually changing the angle of the bones over the years and slowly producing the characteristic bump, which becomes increasingly prominent. Symptoms usually appear at later stages, although some people never have symptoms.    Causes  Bunions are most often caused by an inherited faulty mechanical structure of the foot. It is not the bunion itself that is inherited but certain foot types that make a person prone to developing a bunion.    Although wearing shoes that crowd the toes will not actually cause bunions, it sometimes makes the deformity get progressively worse. Symptoms may therefore appear sooner.    Symptoms  Symptoms, which occur at the site of the bunion, may include:    Pain or soreness  Inflammation and redness  A burning sensation  Possible numbness    Symptoms occur most often when wearing shoes that crowd the toes, such as shoes with a tight toe box or high heels. This may explain why women are more likely to have symptoms than men. In addition, spending long periods of time on your feet can aggravate the symptoms of bunions.    Diagnosis  Diagram indicating location of bunion on a footBunions are readily apparent--the prominence is visible at the base of the big toe or side of the foot. However, to fully evaluate the condition,  the foot and ankle surgeon may take x-rays to determine the degree of the deformity and assess the changes that have occurred.    Because bunions are progressive, they do not go away and will usually get worse over time. But not all cases are alike--some bunions progress more rapidly than others. Once your surgeon has evaluated your bunion, a treatment plan can be developed that is suited to your needs.    Nonsurgical Treatment  Sometimes observation of the bunion is all that is needed. To reduce the chance of damage to the joint, periodic evaluation and x-rays by your surgeon are advised.    In many other cases, however, some type of treatment is needed. Early treatments are aimed at easing the pain of bunions, but they will not reverse the deformity itself. These include:    Changes in shoewear. Wearing the right kind of shoes is very important. Choose shoes that have a wide toe box and forgo those with pointed toes or high heels, which may aggravate the condition.  Padding. Pads placed over the area of the bunion can help minimize pain. These can be obtained from your surgeon or purchased at a drug store.  Activity modifications. Avoid activity that causes bunion pain, including standing for long periods of time.  Medications. Oral nonsteroidal anti-inflammatory drugs (NSAIDs), such as ibuprofen, may be recommended to reduce pain and inflammation.  Icing. Applying an ice pack several times a day helps reduce inflammation and pain.  Injection therapy. Although rarely used in bunion treatment, injections of corticosteroids may be useful in treating the inflamed bursa (fluid-filled sac located around a joint) sometimes seen with bunions.  Orthotic devices. In some cases, custom orthotic devices may be provided by the foot and ankle surgeon.      Recommended OTC orthotics:  -powerstep  -superfeet    Recommended shoegear:  -new balance  -ascics  -mizuno  -barboza       When Is Surgery Needed?  If nonsurgical treatments  fail to relieve bunion pain and when the pain of a bunion interferes with daily activities, it is time to discuss surgical options with a foot and ankle surgeon. Together you can decide if surgery is best for you.    A variety of surgical procedures is available to treat bunions. The procedures are designed to remove the bump of bone, correct the changes in the bony structure of the foot and correct soft tissue changes that may also have occurred. The goal of surgery is the reduction of pain and deformity.    In selecting the procedure or combination of procedures for your particular case, the foot and ankle surgeon will take into consideration the extent of your deformity based on the x-ray findings, your age, your activity level and other factors. The length of the recovery period will vary, depending on the procedure or procedures performed.                                          Bunion    You have a bunion. This is a bony bump at the base of your big toe, along the inside edge of your foot. As the bump gets bigger, it can become red, swollen, and painful with shoe wear.  Bunions may occur if you wear shoes that are too tight and pinch your toes together. High heels may make this worse. In some cases a bunion is due to poor alignment of the foot and ankle. This puts extra weight on the instep of each foot.  Once a bunion forms, it changes the way weight is spread all across your foot. This causes the bunion to get worse over time. The big toe will bend more and more toward the other toes.  A minor bunion can be treated by:  Wearing properly fitting shoes  Using bunion pads  Wearing shoe inserts, called orthotics, to better align the foot and ankle  Physical therapy with ultrasound or whirlpool baths can ease pain, redness, and swelling. Severe cases may require surgery. If you dont treat what is causing the bunion, it may get larger and more painful.  Home care  Limit high heels. These shoes force your foot  forward, crowding the toes together.  Switch to comfortable shoes with a wide toe area. Or have your existing shoes stretched by a shoe repair shop.  Avoid shoes that are tight, narrow, or pointed.  If you are flat-footed, using arch supports may help prevent further deformity. The best shoe inserts are the ones custom made by a foot specialist, called a podiatrist, or other healthcare provider.  Put a bunion pad over the bunion to ease pressure of your shoe against the bunion. You can buy these pads at most pharmacies without a prescription  To reduce pain and swelling, apply an ice pack over the injured area for 15 to 20 minutes. Do this every 1 to 2 hours the first day. Keep using ice 3 to 4 times a day until the pain and swelling goes away.  To make an ice pack, put ice cubes in a sealed zip-lock plastic bag. Wrap the bag in a clean, thin towel or cloth. Never put ice or an ice pack directly on the skin.  You may use over-the-counter pain medicine to control pain, unless another medicine was prescribed. Talk with your provider before using these medicines if you have chronic liver or kidney disease, or ever had a stomach ulcer or GI (gastrointestinal) bleeding.    Follow-up care  Follow up with a podiatrist or foot doctor, or as advised.  If X-rays were taken, you will be notified of any new findings that may affect your care.  When to seek medical care  Contact your healthcare provider if any of the following occur:  Increasing pain or redness around the base of the big toe  Painful ingrown toenail, with redness and swelling or pus around the nail  Date Last Reviewed: 11/21/2015  © 8571-4105 [employer]. [address] [place] [address]. All rights reserved. This information is not intended as a substitute for professional medical care. Always follow your healthcare professional's instructions.        Treating Bunions  Although a bunion wont go away, wearing shoes that fit properly will  "often relieve the pain. Padding and icing the bunion may also help. Bunions that remain painful may need surgery.     Heels: Heel height should be low. The back of the shoe should  your heel firmly so the shoe doesn't flop when you walk.         Toes: There should be 1/2" between your longest toe and the tip of the shoe. The shoe should be wide enough for you to wiggle your toes.    Shoes  To relieve a bunion, you dont have to buy shoes that are ugly or out of fashion. But follow these tips:  Shop for shoes late in the day. This is when your feet are the largest.  Have both feet measured often. Fit shoes to your larger foot.  Look for shoes that have the same shape as your foot but are slightly wider across the toes.  Choose low-heeled shoes.  Always try shoes on. Stand up and walk around. If the shoes arent comfortable, dont buy them.  Ice massage  To help relieve a painful bunion, put an ice cube in a plastic bag. Rub the ice on the bunion for 5 minutes. Repeat 2 to 3 times a day.  Pads  You may want to put a pad over the bunion to cushion it. You can buy bunion pads at most [facility].  Surgery  Wearing wider shoes and padding the bunion may not relieve the pain. Your healthcare provider may then suggest surgery. During surgery, the bunion is shaved away and the bones are put back in a straight line.   Date Last Reviewed: 9/27/2015 © 2000-2016 [employer]. [address] [place] [address], [place] [address]. All rights reserved. This information is not intended as a substitute for professional medical care. Always follow your healthcare professional's instructions.      Osteotomy and Ligament or Tendon Repair (Bunion Surgery)  Osteotomy and ligament or tendon repair is a type of bunion surgery. A bunion is a bony bump (growth) at the base of your big toe. This growth can form when your big toe pushes against your next toe. A bunion can cause pain, swelling, redness, and other symptoms. During this " surgery, bone is removed from your toe. Nearby tendons and ligaments are made shorter or longer as needed. This allows your big toe to line up (align) properly.    Preparing for surgery  Follow any instructions from your healthcare provider.  Tell your surgeon about any medicines you are taking. You may need to stop taking all or some of these before the procedure. This includes:  All prescription medicines  Over-the-counter medicines such as aspirin or ibuprofen  Street drugs  Herbs, vitamins, and other supplements  Also, follow any directions youre given for not eating or drinking before surgery.  The day of surgery  The surgery takes at least 60 minutes. You will likely go home the same day.  Before the surgery begins  An IV (intravenous) line is put into a vein in your arm or hand. This line gives you fluids and medicines.  You may be given medicine to help you relax (sedation). To keep you free of pain during the surgery, you may have medicine to block the nerves in your foot. Or, you will be given general anesthesia. This puts you into a deep sleep.  During the surgery  A cut (incision) is made on your foot to expose the bunion bump, and the tendons and ligaments around it. The tendons and ligaments that are tight are cut (released).  The bunion bump is removed with a bone saw. Your big toe bone or the main bone in your foot is shortened and realigned. A pin, screw, or plate is used to hold your toe and foot bones together.  The nearby tendons and ligaments may be tightened. If there is extra tissue, it is removed and the ends are stitched (sutured) together. The incision in your skin is then closed with sutures. Your foot is bandaged.  After the surgery  Youll be taken to a recovery room. You may have medicines to manage pain. You may wear a brace, surgical shoe, or cast to protect your foot while it heals. The surgeon will tell you when you can go home. Have an adult family member or friend drive  you.  Recovering at home  Once home, follow any instructions you are given. During your recovery:  Take pain medicine exactly as directed.  To prevent swelling, sit or lie with your leg raised on one or more pillows. Do this for the first 2 days.  Follow your surgeon's instructions about putting weight on your foot after the surgery. You may need to use a walker, cane, or crutches for a time.  You may wear a brace, surgical shoe, or cast for up to a month or longer. Care for this as instructed. Keep it dry by wrapping it in plastic bags when bathing.  Avoid sports and other activities until your surgeon says its OK.  Care for your incision as instructed.  Don't drive until your surgeon says its OK.  Call your surgeon if you have any of the following:  Chest pain or trouble breathing  Fever of 100.4°F (38°C) or higher, or as directed by your surgeon  Pain that isnt helped by medicine or rest  Increased swelling not helped by raising or icing your foot  Signs of infection at any incision site, such as increased redness or swelling, warmth, more pain, or bad-smelling drainage  Bleeding through the bandages  Symptoms of poor circulation, such as toes that look blue instead of pinkish  Numbness that doesnt go away  Any other signs or symptoms indicated by your surgeon   Follow-up  Keep all follow-up appointments with your surgeon. These are to check that you are healing well from the surgery. You may have X-rays to check how the bone is healing. Physical therapy, foot exercises, and other treatments may be discussed at follow-up visits. Full recovery can take at least a few months.  Risks and possible complications include:  Infection  Bleeding  Sensitivity at the incision site for months after the surgery  Foot pain that doesnt go away after surgery  Numbness in the foot  Only partial relief of symptoms, or no relief of symptoms  Return of the bunion  Risks of anesthesia (the anesthesiologist will discuss these  with you)  Poor wound healing  Breakage of screws or pins  A lot of scarring   Date Last Reviewed: 7/28/2015  © 4825-6293 [employer]. [address] [place] [address], [place] [address]. All rights reserved. This information is not intended as a substitute for professional medical care. Always follow your healthcare professional's instructions.          Recommended OTC orthotics:  -powerstep  -superfeet    Recommended shoegear:  -new balance  -ascics  -tushar gardner

## 2023-02-08 DIAGNOSIS — M79.642 BILATERAL HAND PAIN: Primary | ICD-10-CM

## 2023-02-08 DIAGNOSIS — M79.641 BILATERAL HAND PAIN: Primary | ICD-10-CM

## 2023-02-08 NOTE — PROGRESS NOTES
"Subjective:      Patient ID: Fawn Waters is a 52 y.o. female.    Chief Complaint: Pain of the Left Hand (Patient presents today as a new patient complaining of bilateral hand pain.  Patient stated she has been having pain for about the last 2 months. ) and Pain of the Right Hand      HPI  (Citizen of [place])    She has known RA and sees rheumatology at [facility].     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 She is right hand dominant. She rates her pain as a 4 on a scale of 1-10. She has weakness in both hands as well.     She is taking mobic- not sure if it's helping hands. Does help her back. No OT, injections, or bracing. No surgery on her hands.       Past Medical History:   Diagnosis Date    Asthma     Depression     Urinary tract infection     Vaginal infection          Current Outpatient Medications:     cetirizine (ZYRTEC) 10 MG tablet, , Disp: , Rfl:     fluoxetine (PROZAC) 20 MG capsule, , Disp: , Rfl:     omeprazole (PRILOSEC) 20 MG capsule, , Disp: , Rfl:     diclofenac (VOLTAREN) 75 MG EC tablet, Take 1 tablet (75 mg total) by mouth 2 (two) times daily with meals., Disp: 60 tablet, Rfl: 2    doxycycline (VIBRAMYCIN) 100 MG Cap, Take 100 mg by mouth., Disp: , Rfl:     solifenacin (VESICARE) 5 MG tablet, Take 1 tablet (5 mg total) by mouth once daily., Disp: 30 tablet, Rfl: 11    Review of patient's allergies indicates:   Allergen Reactions    Codeine      Pt states she is in recovery and cannot take any narcotic medications       Review of Systems   Constitutional: Negative for chills, fever, night sweats and weight gain.   Gastrointestinal:  Negative for bowel incontinence, nausea and vomiting.   Genitourinary:  Negative for bladder incontinence.   Neurological:  Negative for disturbances in coordination and loss of balance.         Objective:        Ht 5' 9" (1.753 m)   Wt 68.9 kg (151 lb 12.8 " oz)   BMI 22.42 kg/m²     General    Vitals reviewed.  Constitutional: She is oriented to person, place, and time. She appears well-developed and well-nourished.   Pulmonary/Chest: Effort normal.   Abdominal: She exhibits no distension.   Neurological: She is alert and oriented to person, place, and time.   Psychiatric: She has a normal mood and affect. Her behavior is normal. Judgment and thought content normal.         RIGHT Hand/Wrist Examination:    Observation/Inspection:  Swelling  none    Deformity  none  Discoloration  none     Scars   none    Atrophy  none    HAND/WRIST EXAMINATION:  Finkelstein's Test   positive  WHAT Test    positive  Snuff box tenderness   Neg  Hook of Hamate Tenderness  Neg  CMC grind    Neg    Neurovascular Exam:  Digits WWP, brisk CR < 3s throughout  NVI motor/LTS to M/R/U nerves, radial pulse 2+  Tinel's Test - Carpal Tunnel  Neg  Tinel's Test - Cubital Tunnel  Neg  Phalen's Test    positive  Median Nerve Compression Test positive    ROM hand/wrist/elbow full, painless      LEFT Hand/Wrist Examination:    Observation/Inspection:  Swelling  none    Deformity  none  Discoloration  none     Scars   none    Atrophy  none    HAND/WRIST EXAMINATION:  Finkelstein's Test   positive  WHAT Test    positive  Snuff box tenderness   Neg  Hook of Hamate Tenderness  Neg  CMC grind    Neg    Neurovascular Exam:  Digits WWP, brisk CR < 3s throughout  NVI motor/LTS to M/R/U nerves, radial pulse 2+  Tinel's Test - Carpal Tunnel  Neg  Tinel's Test - Cubital Tunnel  Neg  Phalen's Test    positive  Median Nerve Compression Test positive    ROM hand/wrist/elbow full, painless      XRAY INTERPRETATION:   X-rays of bilateral hands dated 2/14/23 are personally reviewed and show no fracture or dislocation.          Assessment:       Encounter Diagnoses   Name Primary?    Numbness and tingling in both hands Yes    De Quervain's tenosynovitis, bilateral           Plan:       Fawn HEAD was seen today for pain and  pain.    Diagnoses and all orders for this visit:    Numbness and tingling in both hands  -     EMG W/ ULTRASOUND AND NERVE CONDUCTION TEST 2 Extremities; Future  -     Ambulatory referral/consult to Physical/Occupational Therapy; Future    De Quervain's tenosynovitis, bilateral  -     Ambulatory referral/consult to Physical/Occupational Therapy; Future    Other orders  -     diclofenac (VOLTAREN) 75 MG EC tablet; Take 1 tablet (75 mg total) by mouth 2 (two) times daily with meals.      She has known RA and sees rheumatology at [facility].     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 She is right hand dominant.     XRs of both hands show no fracture or dislocation. Exam is suspicious for carpal tunnel syndrome. She also is likely developing bilateral dequervain's tenosynovitis as well.     Treatment options reviewed with patient along with above bilateral hand xrays. Following plan made:     - OT orders for bilateral hands sent to [facility].   - New prescription for voltaren. Reviewed dosing and side effects. Take with food. Stop mobic.   - Given black thumb brace x 2. Will wear at night and prn during the day.   - Consider injections for dequervain's if no improvement.   - EMG/NCS of bilateral UEs to further evaluate bilateral UE numbness/tingling. Orders to [facility] neurology.   - Will see her back after EMG to review.     Follow up in about 2 months (around 4/14/2023).

## 2023-02-14 ENCOUNTER — OFFICE VISIT (OUTPATIENT)
Dept: ORTHOPEDICS | Facility: CLINIC | Age: 53
End: 2023-02-14
Payer: MEDICAID

## 2023-02-14 VITALS — HEIGHT: 69 IN | WEIGHT: 151.81 LBS | BODY MASS INDEX: 22.48 KG/M2

## 2023-02-14 DIAGNOSIS — M65.4 DE QUERVAIN'S TENOSYNOVITIS, BILATERAL: ICD-10-CM

## 2023-02-14 DIAGNOSIS — R20.0 NUMBNESS AND TINGLING IN BOTH HANDS: Primary | ICD-10-CM

## 2023-02-14 DIAGNOSIS — R20.2 NUMBNESS AND TINGLING IN BOTH HANDS: Primary | ICD-10-CM

## 2023-02-14 PROCEDURE — 3008F BODY MASS INDEX DOCD: CPT | Mod: CPTII,,, | Performed by: PHYSICIAN ASSISTANT

## 2023-02-14 PROCEDURE — 99204 OFFICE O/P NEW MOD 45 MIN: CPT | Mod: S$PBB,,, | Performed by: PHYSICIAN ASSISTANT

## 2023-02-14 PROCEDURE — 99999 PR PBB SHADOW E&M-EST. PATIENT-LVL III: ICD-10-PCS | Mod: PBBFAC,,, | Performed by: PHYSICIAN ASSISTANT

## 2023-02-14 PROCEDURE — 3008F PR BODY MASS INDEX (BMI) DOCUMENTED: ICD-10-PCS | Mod: CPTII,,, | Performed by: PHYSICIAN ASSISTANT

## 2023-02-14 PROCEDURE — 1160F PR REVIEW ALL MEDS BY PRESCRIBER/CLIN PHARMACIST DOCUMENTED: ICD-10-PCS | Mod: CPTII,,, | Performed by: PHYSICIAN ASSISTANT

## 2023-02-14 PROCEDURE — 1159F PR MEDICATION LIST DOCUMENTED IN MEDICAL RECORD: ICD-10-PCS | Mod: CPTII,,, | Performed by: PHYSICIAN ASSISTANT

## 2023-02-14 PROCEDURE — 1160F RVW MEDS BY RX/DR IN RCRD: CPT | Mod: CPTII,,, | Performed by: PHYSICIAN ASSISTANT

## 2023-02-14 PROCEDURE — 1159F MED LIST DOCD IN RCRD: CPT | Mod: CPTII,,, | Performed by: PHYSICIAN ASSISTANT

## 2023-02-14 PROCEDURE — 99999 PR PBB SHADOW E&M-EST. PATIENT-LVL III: CPT | Mod: PBBFAC,,, | Performed by: PHYSICIAN ASSISTANT

## 2023-02-14 PROCEDURE — 99213 OFFICE O/P EST LOW 20 MIN: CPT | Mod: PBBFAC,PN | Performed by: PHYSICIAN ASSISTANT

## 2023-02-14 PROCEDURE — 99204 PR OFFICE/OUTPT VISIT, NEW, LEVL IV, 45-59 MIN: ICD-10-PCS | Mod: S$PBB,,, | Performed by: PHYSICIAN ASSISTANT

## 2023-02-14 RX ORDER — DOXYCYCLINE 100 MG/1
100 CAPSULE ORAL
COMMUNITY
Start: 2023-02-09 | End: 2023-05-08

## 2023-02-14 RX ORDER — DICLOFENAC SODIUM 75 MG/1
75 TABLET, DELAYED RELEASE ORAL 2 TIMES DAILY WITH MEALS
Qty: 60 TABLET | Refills: 2 | Status: SHIPPED | OUTPATIENT
Start: 2023-02-14 | End: 2023-05-08

## 2023-02-14 RX ORDER — MELOXICAM 15 MG/1
15 TABLET ORAL
COMMUNITY
Start: 2022-10-13 | End: 2023-02-14

## 2023-02-14 NOTE — PATIENT INSTRUCTIONS
It was nice to meet you today! I am sorry that you are hurting so much.     Your hand xrays look good. I think your numbness/tingling is from carpal tunnel syndrome. You also are developing tendonitis.     I want to get a nerve test/EMG to look into your symptoms further. I sent orders to [facility] neurology in [place]. They will call you to set this up. Their number is [phone] if you need to call them.     Wear the thumb brace as needed to help with pain. Wear at night.     I sent diclofenac to your pharmacy to help with pain/inflammation. Take as directed with food. Stop the meloxicam.     I sent physical therapy orders to [facility]. They should call you to set up, but if not you can call [phone].     I will see you back after the nerve test, but please stay in touch and call me if you need anything. You can also send me a message in [facility].     Lupe   [phone]

## 2023-03-22 ENCOUNTER — TELEPHONE (OUTPATIENT)
Dept: ORTHOPEDICS | Facility: CLINIC | Age: 53
End: 2023-03-22
Payer: MEDICAID

## 2023-03-22 NOTE — TELEPHONE ENCOUNTER
Spoke to patient. Patient was inform that I would forward message to provider and either she will give her a cll back or I will. Patient replied.. OK.

## 2023-03-22 NOTE — TELEPHONE ENCOUNTER
----- Message from Cherelle Jesus MA sent at 3/22/2023 12:08 PM CDT -----  Contact: Pt    ----- Message -----  From: Bassam Thomas  Sent: 3/22/2023  11:03 AM CDT  To: Evette Andrade Staff    .Type:  Needs Medical Advice    Who Called: Pt  Would the patient rather a call back or a response via [facility]? call  Best Call Back Number: [phone]  Additional Information:   Pt would like to know when she will be getting her nerve testing done.  Pt stated she has been waiting a month and a half to hear something back.

## 2023-03-22 NOTE — TELEPHONE ENCOUNTER
----- Message from Kavita Perez sent at 3/22/2023 10:52 AM CDT -----  Type:  Needs Medical Advice    Who Called: pt  Symptoms (please be specific): pt is requesting to get a return call to get assistance with getting scheduled for a  EMG W/ ULTRASOUND AND NERVE CONDUCTION TEST 2 Extremities  :    Would the patient rather a call back or a response via [facility]? call  Best Call Back Number:[phone]   Additional Information:

## 2023-03-23 ENCOUNTER — TELEPHONE (OUTPATIENT)
Dept: ORTHOPEDICS | Facility: CLINIC | Age: 53
End: 2023-03-23
Payer: MEDICAID

## 2023-03-23 NOTE — TELEPHONE ENCOUNTER
I can send to [facility] in [place]. Please fax form with her facesheet and last note. They will call her to schedule.     She can call [phone] if she doesn't hear from them.     If she gets in earlier with [facility], then have her cancel appt in [place].

## 2023-03-23 NOTE — TELEPHONE ENCOUNTER
----- Message from Cherelle Jesus MA sent at 3/23/2023  3:14 PM CDT -----  Contact: Pt.    ----- Message -----  From: Shelton Patel  Sent: 3/23/2023   9:17 AM CDT  To: Evette Andrade Staff    .Type:  Needs Medical Advice    Who Called: pt  Would the patient rather a call back or a response via [facility]?  Call back  Best Call Back Number: [phone]   Additional Information: Pt. Is requesting a call back from the provider regarding her nerve test date.  Pt. States she can not wait that long.

## 2023-03-23 NOTE — TELEPHONE ENCOUNTER
Spoke to patient she was informed that her referral was sent over to [facility] and she was also given the number to call and check the status.

## 2023-04-17 ENCOUNTER — TELEPHONE (OUTPATIENT)
Dept: ORTHOPEDICS | Facility: CLINIC | Age: 53
End: 2023-04-17
Payer: MEDICAID

## 2023-04-17 NOTE — TELEPHONE ENCOUNTER
----- Message from Cherelle Jesus MA sent at 4/17/2023  3:22 PM CDT -----  Please Advise. Thank You  ----- Message -----  From: Jenny Villeda  Sent: 4/17/2023  11:28 AM CDT  To: Evette Andrade Staff    Type: General Call Back     Name of Caller:REHANA GARCES [[id number]]  Symptoms:pains in hands   Would the patient rather a call back or a response via [facility]? Call back   Best Call Back Number:[phone]  Additional Information: Patient indicates she would like to be seen sooner than the appointment she is currently scheduled. Patient indicates she is not able to function with the current pains in her hands and would like to see if there is anyway to move her appointment date sooner. Patient indicates she would like to schedule her appointment with the provider and the tests sooner. Patient would like a call back with further assistance and more information if possible. Please call back with further assistance and more information.

## 2023-05-04 NOTE — PROGRESS NOTES
Subjective:      Patient ID: Fawn Waters is a 52 y.o. female.    Chief Complaint: No chief complaint on file.      HPI  (French)    She has known RA and sees rheumatology at [facility].     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 She is right hand dominant.     Previous hand XRs looked okay. Symptoms suspicious for carpal tunnel syndrome. She may have been developing bilateral dequervain's tenosynovitis as well.     She was sent to OT (did not go). She was given voltaren along with black thumb braces to use prn.     She is here for follow up and to review her EMG.      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 She is right hand dominant. She rates her pain as an 8 on a scale of 1-10. She has weakness in both hands as well.     Primary complaint is pain at the base of her thumbs. Braces I gave her last time made her worse. Voltaren is making her itch. OT wanted her to go to here- she wants to go to [place]. No surgery on her hands.       Past Medical History:   Diagnosis Date    Asthma     Depression     Urinary tract infection     Vaginal infection          Current Outpatient Medications:     cetirizine (ZYRTEC) 10 MG tablet, , Disp: , Rfl:     fluoxetine (PROZAC) 20 MG capsule, , Disp: , Rfl:     hydrOXYzine HCL (ATARAX) 10 MG Tab, Take 10 mg by mouth every 8 (eight) hours as needed., Disp: , Rfl:     omeprazole (PRILOSEC) 20 MG capsule, , Disp: , Rfl:     nabumetone (RELAFEN) 500 MG tablet, Take 1 tablet (500 mg total) by mouth 2 (two) times daily with meals., Disp: 60 tablet, Rfl: 2      Review of patient's allergies indicates:   Allergen Reactions    Codeine      Pt states she is in recovery and cannot take any narcotic medications       Review of Systems    Constitutional: Negative for chills, fever, night sweats and weight gain.   Gastrointestinal:  Negative for bowel incontinence, nausea and vomiting.   Genitourinary:  Negative for bladder incontinence.   Neurological:  Negative for disturbances in coordination and loss of balance.         Objective:        There were no vitals taken for this visit.    Ortho/SPM Exam    RIGHT Hand/Wrist Examination:    Observation/Inspection:  Swelling  none    Deformity  none  Discoloration  none     Scars   none    Atrophy  none    HAND/WRIST EXAMINATION:  Finkelstein's Test   positive  WHAT Test    positive  Snuff box tenderness   Neg  Hook of Hamate Tenderness  Neg  CMC grind    Neg    Neurovascular Exam:  Digits WWP, brisk CR < 3s throughout  NVI motor/LTS to M/R/U nerves, radial pulse 2+  Tinel's Test - Carpal Tunnel  Neg  Tinel's Test - Cubital Tunnel  Neg  Phalen's Test    positive  Median Nerve Compression Test positive    ROM hand/wrist/elbow full, painless    She has point tenderness over first dorsal compartment.       LEFT Hand/Wrist Examination:    Observation/Inspection:  Swelling  none    Deformity  none  Discoloration  none     Scars   none    Atrophy  none    HAND/WRIST EXAMINATION:  Finkelstein's Test   positive  WHAT Test    positive  Snuff box tenderness   Neg  Hook of Hamate Tenderness  Neg  CMC grind    Neg    Neurovascular Exam:  Digits WWP, brisk CR < 3s throughout  NVI motor/LTS to M/R/U nerves, radial pulse 2+  Tinel's Test - Carpal Tunnel  Neg  Tinel's Test - Cubital Tunnel  Neg  Phalen's Test    positive  Median Nerve Compression Test positive    ROM hand/wrist/elbow full, painless    She has point tenderness over first dorsal compartment.       EMG of bilateral UEs dated 4/13/23 are personally reviewed and show:   Bilateral carpal tunnel syndrome, moderate on right and very mild on left.     EMG report scanned into chart from [facility].         Assessment:       Encounter Diagnoses   Name Primary?    De  Quervain's tenosynovitis, bilateral Yes    Bilateral carpal tunnel syndrome             Plan:       Diagnoses and all orders for this visit:    De Quervain's tenosynovitis, bilateral  -     nabumetone (RELAFEN) 500 MG tablet; Take 1 tablet (500 mg total) by mouth 2 (two) times daily with meals.  -     Ambulatory referral/consult to Physical/Occupational Therapy; Future  -     Tendon Sheath    Bilateral carpal tunnel syndrome  -     nabumetone (RELAFEN) 500 MG tablet; Take 1 tablet (500 mg total) by mouth 2 (two) times daily with meals.  -     Ambulatory referral/consult to Physical/Occupational Therapy; Future  -     Tendon Sheath        She has known RA and sees rheumatology at [facility].     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 She is right hand dominant.     Primary complaint is pain at the base of her thumbs.     Previous XRs of both hands showed no fracture or dislocation. Exam is consistent with bilateral dequervain's tenosynovitis as well carpal tunnel syndrome.     EMG showed (4/13/23) showed bilateral carpal tunnel syndrome, moderate on right and very mild on left.     Treatment options reviewed with patient along with above EMG. Following plan made:     - Discussed treatment for carpal tunnel syndrome including surgery and injections. She cannot be out of work for surgery at this time.   - Worst pain is from dequervain's tenosynovitis in both hands.   - Injections done into BILATERAL first dorsal compartments for dequervain's. See procedure note.   - Did not tolerate black thumb braces. Given gel wrist braces to wear at night. Can use prn during the day.   - Stop voltaren gel and po voltaren. New prescription for relafen.  Reviewed dosing and side effects. Take with food.   - OT orders sent to [facility] for both hands.   - Consider carpal tunnel injections at follow up.     Follow up  in about 6 weeks (around 6/19/2023).

## 2023-05-08 ENCOUNTER — OFFICE VISIT (OUTPATIENT)
Dept: ORTHOPEDICS | Facility: CLINIC | Age: 53
End: 2023-05-08
Payer: MEDICAID

## 2023-05-08 DIAGNOSIS — M65.4 DE QUERVAIN'S TENOSYNOVITIS, BILATERAL: Primary | ICD-10-CM

## 2023-05-08 DIAGNOSIS — G56.03 BILATERAL CARPAL TUNNEL SYNDROME: ICD-10-CM

## 2023-05-08 PROCEDURE — 1160F PR REVIEW ALL MEDS BY PRESCRIBER/CLIN PHARMACIST DOCUMENTED: ICD-10-PCS | Mod: CPTII,,, | Performed by: PHYSICIAN ASSISTANT

## 2023-05-08 PROCEDURE — 99213 OFFICE O/P EST LOW 20 MIN: CPT | Mod: PBBFAC,PN,25 | Performed by: PHYSICIAN ASSISTANT

## 2023-05-08 PROCEDURE — 99214 PR OFFICE/OUTPT VISIT, EST, LEVL IV, 30-39 MIN: ICD-10-PCS | Mod: 25,S$PBB,, | Performed by: PHYSICIAN ASSISTANT

## 2023-05-08 PROCEDURE — 20550 NJX 1 TENDON SHEATH/LIGAMENT: CPT | Mod: PBBFAC,50,PN | Performed by: PHYSICIAN ASSISTANT

## 2023-05-08 PROCEDURE — 20550 TENDON SHEATH: ICD-10-PCS | Mod: S$PBB,50,, | Performed by: PHYSICIAN ASSISTANT

## 2023-05-08 PROCEDURE — 1159F PR MEDICATION LIST DOCUMENTED IN MEDICAL RECORD: ICD-10-PCS | Mod: CPTII,,, | Performed by: PHYSICIAN ASSISTANT

## 2023-05-08 PROCEDURE — 1160F RVW MEDS BY RX/DR IN RCRD: CPT | Mod: CPTII,,, | Performed by: PHYSICIAN ASSISTANT

## 2023-05-08 PROCEDURE — 1159F MED LIST DOCD IN RCRD: CPT | Mod: CPTII,,, | Performed by: PHYSICIAN ASSISTANT

## 2023-05-08 PROCEDURE — 99999 PR PBB SHADOW E&M-EST. PATIENT-LVL III: ICD-10-PCS | Mod: PBBFAC,,, | Performed by: PHYSICIAN ASSISTANT

## 2023-05-08 PROCEDURE — 99214 OFFICE O/P EST MOD 30 MIN: CPT | Mod: 25,S$PBB,, | Performed by: PHYSICIAN ASSISTANT

## 2023-05-08 PROCEDURE — 99999 PR PBB SHADOW E&M-EST. PATIENT-LVL III: CPT | Mod: PBBFAC,,, | Performed by: PHYSICIAN ASSISTANT

## 2023-05-08 PROCEDURE — 20550 NJX 1 TENDON SHEATH/LIGAMENT: CPT | Mod: S$PBB,50,, | Performed by: PHYSICIAN ASSISTANT

## 2023-05-08 RX ORDER — DICLOFENAC SODIUM 10 MG/G
2 GEL TOPICAL
COMMUNITY
Start: 2022-10-13 | End: 2023-05-08

## 2023-05-08 RX ORDER — NABUMETONE 500 MG/1
500 TABLET, FILM COATED ORAL 2 TIMES DAILY WITH MEALS
Qty: 60 TABLET | Refills: 2 | Status: SHIPPED | OUTPATIENT
Start: 2023-05-08 | End: 2023-06-19

## 2023-05-08 RX ORDER — TRIAMCINOLONE ACETONIDE 40 MG/ML
40 INJECTION, SUSPENSION INTRA-ARTICULAR; INTRAMUSCULAR
Status: DISCONTINUED | OUTPATIENT
Start: 2023-05-08 | End: 2023-05-08 | Stop reason: HOSPADM

## 2023-05-08 RX ORDER — HYDROXYZINE HYDROCHLORIDE 10 MG/1
10 TABLET, FILM COATED ORAL EVERY 8 HOURS PRN
COMMUNITY
Start: 2023-04-12

## 2023-05-08 RX ADMIN — TRIAMCINOLONE ACETONIDE 40 MG: 40 INJECTION, SUSPENSION INTRA-ARTICULAR; INTRAMUSCULAR at 08:05

## 2023-05-08 NOTE — PATIENT INSTRUCTIONS
It was nice to see you again today! I am sorry that you are hurting so much.     You have carpal tunnel syndrome in both hands. I think you also have tendonitis (dequervain's tenosynovitis).     The injections that I did today should give you some good relief of pain. It is normal to have some increased soreness over the next few days after an injection. Put ice on it and elevate. This will get better.     I sent nabumetone to your pharmacy to help with pain/inflammation. Take as directed with food.     I sent occupational therapy orders to [facility]. They should call you to set up, but if not you can call [phone].     Depending on results of above, may also consider carpal tunnel injections.     I will see you back in 6 weeks, but please stay in touch and call me if you need anything. You can also send me a message in [facility].     Lupe   [phone]

## 2023-05-08 NOTE — PROCEDURES
Tendon Sheath    Date/Time: 5/8/2023 8:00 AM  Performed by: Lupe Alas PA-C  Authorized by: Lupe Alas PA-C     Consent Done?:  Yes (Verbal)  Timeout: prior to procedure the correct patient, procedure, and site was verified    Location:  Wrist  Site:  R first doral compartment and L first doral compartment  Medications:  40 mg triamcinolone acetonide 40 mg/mL    Additional Comments: PROCEDURE NOTE:  INJECTION OF BOTH FIRST DORSAL COMPARTMENTS    I have explained the risks, benefits, and alternatives of the procedure in detail.  The patient voices understanding and all questions have been answered.  The patient agrees to proceed as planned.    After a sterile prep of the skin using chloraprep one step, the area was sprayed with local topical anesthetic and then cleaned with alcohol. BILATERAL 1st dorsal compartmentws were injected from the radial approach using a 25 gauge needle with a combination of 1/2 cc 1% plain xylocaine and 20mg of triamcinolone in each side. The patient is cautioned that immediate relief of pain is secondary to the local anesthetic and will be temporary. After the anesthetic wears off there may be a increase in pain that may last for a few hours or a few days and they should use ice to help alleviate this this pain.     If patient is diabetic, post injection elevation of blood sugar was discussed. Patient is to check blood sugar regularly and call PCP with any issues.     Patient tolerated the procedure well.

## 2023-05-22 ENCOUNTER — CLINICAL SUPPORT (OUTPATIENT)
Dept: REHABILITATION | Facility: HOSPITAL | Age: 53
End: 2023-05-22
Payer: MEDICAID

## 2023-05-22 DIAGNOSIS — M65.4 DE QUERVAIN'S TENOSYNOVITIS, BILATERAL: ICD-10-CM

## 2023-05-22 DIAGNOSIS — G56.03 BILATERAL CARPAL TUNNEL SYNDROME: ICD-10-CM

## 2023-05-22 DIAGNOSIS — M25.60 STIFFNESS IN JOINT: ICD-10-CM

## 2023-05-22 DIAGNOSIS — M79.641 PAIN IN BOTH HANDS: ICD-10-CM

## 2023-05-22 DIAGNOSIS — R53.1 WEAKNESS: ICD-10-CM

## 2023-05-22 DIAGNOSIS — M79.642 PAIN IN BOTH HANDS: ICD-10-CM

## 2023-05-22 PROCEDURE — 97165 OT EVAL LOW COMPLEX 30 MIN: CPT | Mod: PN

## 2023-05-22 NOTE — PLAN OF CARE
[facility]  Occupational Therapy Initial Evaluation     Name: Fawn Waters  Clinic Number: [id number]    Therapy Diagnosis:   Encounter Diagnoses   Name Primary?    De Quervain's tenosynovitis, bilateral     Bilateral carpal tunnel syndrome     Pain in both hands     Weakness     Stiffness in joint      Physician: Lupe Alas, KRIS    Physician Orders: Eval and Treat  Medical Diagnosis:   M65.4 (ICD-10-CM) - De Quervain's tenosynovitis, bilateral   G56.03 (ICD-10-CM) - Bilateral carpal tunnel syndrome   Date of Injury: 6 months  Evaluation Date: 5/22/2023  Insurance Authorization Period Expiration: 12/31/2023  Plan of Care Certification Period: 8/18/2023  Date of Return to MD: 6/19/2023  Visit # / Visits authorized: 1 / 1  FOTO: 1/3    Precautions:  Standard    Time In: 3:15  Time Out: 4:00  Total Appointment Time (timed & untimed codes): 45 minutes    Subjective     History of Current Condition/Mechanism of Injury: Fawn reports: insidious onset with progressively worsening symptoms bilateral hand pain    Involved Side: bilateral right greater than left  Dominant Side: Right    Surgical Procedure: none  Imaging: xrays reveal no abnormalities     Prior Therapy: none    Pain:  Functional Pain Scale Rating 0-10:   4/10 on average  0/10 at best  10/10 at worst  Location: right base of thumb  Description: Tingling and Sharp  Aggravating Factors: twisting, pulling, gripping  Easing Factors: injection, ice    Occupation:  [employer]  Working presently: employed  Duties: baking, mixing, lifting, carrying, gripping, pinching    Functional Limitations/Social History:    Previous functional status includes: Independent with all ADLs.     Current Functional Status   Home/Living environment: lives alone      Limitation of Functional Status as follows:   ADLs/IADLs:     - Feeding: Independent    - Bathing: Independent    - Dressing/Grooming: Independent    - Home Management:  Independent    - Driving: Independent     Leisure: Independent    Patient's Goals for Therapy: to decrease pain and increase functional use    Past Medical History/Physical Systems Review:   Fawn Waters  has a past medical history of Asthma, Depression, Urinary tract infection, and Vaginal infection.    Fawn Waters  has a past surgical history that includes Tubal ligation.    Fawn HEAD has a current medication list which includes the following prescription(s): cetirizine, fluoxetine, hydroxyzine hcl, nabumetone, and omeprazole.    Review of patient's allergies indicates:   Allergen Reactions    Codeine      Pt states she is in recovery and cannot take any narcotic medications        Objective     Observation/Appearance: +Finklestein's right; per pt report NCS reveals CTS bilateral hands    Edema. Measured in centimeters.   5/22/2023 5/22/2023      Left Right     Wrist Crease 16 16.5     Distal Palmar Crease 20 20         Elbow and Wrist ROM. Measured in degrees.   5/22/2023 5/22/2023      Left Right     Supination/Pronation WNL WNL*pain     Wrist Ext/Flex WNL WNL     Wrist RD/UD WNL WNL       Hand ROM. Full composite flexion/extension     Strength (Dynamometer) and Pinch Strength (Pinch Gauge)  Measured in pounds.   5/22/2023 5/22/2023      Left Right     Rung II 45 40     Key Pinch 9 5     3pt Pinch 10 8       Sensation:    5/22/2023 5/22/2023    Left Right   [place] Dion[place]     Normal 1.65-2.83 x x   Diminished Light Touch 3.22-3.61     Diminished Protective 3.84-4.31     Loss of Protective 4.56-6.65     Untestable >6.65         CMS Impairment/Limitation/Restriction for FOTO Hand Survey    Therapist reviewed FOTO scores for Fawn Waters on 5/22/2023.   FOTO documents entered into [employer] - see Media section.    Limitation Score: 52%         Treatment     Total Treatment time (time-based codes) separate from Evaluation: 8 minutes    Fawn received the following supervised modalities after being  cleared for contradictions for 5 minutes:   -paraffin to bilateral hands for pain management and tissue extensibility    Fawn received therapeutic activities for 3 minutes including:  -TGE's, thumb range of motion, prayer stretch, finklestein's stretch    Patient Education and Home Exercises      Education provided:   -role of OT, goals for OT, scheduling/cancellations, insurance limitations with patient.  -Additional Education provided: on current condition and home exercise program     Written Home Exercises Provided: yes.  Exercises were reviewed and Fawn was able to demonstrate them prior to the end of the session.    Fawn demonstrated good  understanding of the education provided.     Pt was advised to perform these exercises free of pain, and to stop performing them if pain occurs.    See EMR under Patient Instructions for exercises provided 5/22/2023.    Assessment     Fawn Waters is a 53 y.o. female referred to outpatient occupational therapy and presents with a medical diagnosis of Bilateral Carpal tunnel syndrome and right DeQuervain's Syndrome.  Pt presents to clinic today with decreased strength and pain in bilateral hands right greater than left both which limit her functional use.      Assessment of Occupational Performance   Performance Deficits    Physical:  Muscle Power/Strength  Muscle Endurance  Edema   Strength  Pinch Strength  Postural Control  Pain    Cognitive:  No Deficits    Psychosocial:    No Deficits     Following medical record review it is determined that pt will benefit from occupational therapy services in order to maximize pain free and/or functional use of bilateral hands. The following goals were discussed with the patient and patient is in agreement with them as to be addressed in the treatment plan. The patient's rehab potential is Good.     Anticipated barriers to occupational therapy: good    Plan of care discussed with patient: Yes  Patient's spiritual,  cultural and educational needs considered and patient is agreeable to the plan of care and goals as stated below:     Medical Necessity is demonstrated by the following  Occupational Profile/History  Co-morbidities and personal factors that may impact the plan of care [x] LOW: Brief chart review  [] MODERATE: Expanded chart review   [] HIGH: Extensive chart review    Moderate / High Support Documentation: N/A     Examination  Performance deficits relating to physical, cognitive or psychosocial skills that result in activity limitations and/or participation restrictions  [x] LOW: addressing 1-3 Performance deficits  [] MODERATE: 3-5 Performance deficits  [] HIGH: 5+ Performance deficits (please support below)    Moderate / High Support Documentation: N/A     Treatment Options [x] LOW: Limited options  [] MODERATE: Several options  [] HIGH: Multiple options      Decision Making/ Complexity Score: low       The following goals were discussed with the patient and patient is in agreement with them as to be addressed in the treatment plan.     Goals:   The following goals were discussed with the patient and patient is in agreement with them as to be addressed in the treatment plan.   Short term Goals:  1) Initiate HEP  2) Pt will increase Right  strength by 5# in order to assist with functional  by 4 weeks.  3) Pt will reduce edema by .5-1 cm in affected right wrist by 4 weeks.  4) Pt will reduce pain to less than 4/10 by 4 weeks.  5) Pt will increase functional  strength by 5# in order to A in opening containers for med management or home management tasks by 4 weeks.   6) Patient will be able to achieve less than or equal to 40% on the FOTO, demonstrating overall improved functional ability with upper extremity. (Self-care category)    Long Term Goals:  Goals to be met by discharge:  1) Independent with HEP  2) Pt will increase Right  to equal to or greater than Left in order to increase functional use  for grasp with home management or work related tasks by d/c.   3) Pt will decrease edema to trace or none to increase functional ROM by d/c.   4) Pt will decrease pain to trace or none while completing light home management tasks or work related tasks by d/c.   5) Patient will be able to achieve less than or equal to 25% on the FOTO, demonstrating overall improved functional ability with upper extremity.  (Self-care category)      Plan     Certification Period/Plan of care expiration: 5/22/2023 to 8/18/2023.    Outpatient Occupational Therapy 1 times weekly for 8 weeks to include the following interventions: Paraffin, Fluidotherapy, Manual therapy/joint mobilizations, Modalities for pain management, Therapeutic exercises/activities., Strengthening, Orthotic Fabrication/Fit/Training, Edema Control, and Joint Protection.    CARLOS Solano

## 2023-05-22 NOTE — PATIENT INSTRUCTIONS
"[facility], OCCUPATIONAL THERAPY  HOME EXERCISE PROGRAM     Complete the following two massages for 3-5 minutes, 1-2x/day:                                                       Complete the following exercises for 2/10 repetitions, 1-2x/day:       AROM: Isolated MCP Flexion / Extension ("Wave")   Bend only your large, bottom knuckles. Hold 3 seconds.   Keep the tips of your fingers straight. Straighten fingers.      AROM: Isolated IPJ Flexion / Extension ("Hook")   Bend only your middle and end knuckles. Hold 3 seconds.   Straighten your fingers.       AROM: MCP and PIP Flexion / Extension ("Straight Fist")  Bend your bottom and middle knuckles, keeping the tips of your fingers straight.   Try to touch the pads of your fingers on your palm. Hold 3 seconds.   Straighten your fingers.       AROM: Composite Flexion / Extension ("Full Fist")  Bend every joint in your hand into a fist. Hold 3 seconds.   Straighten your fingers.         AROM: Composte Extension ("Finger Lifts")  Lift your finger off of the table one at a time. Hold 3 seconds.   Relax your finger.      AROM: Abduction / Adduction  With hand flat on table, spread all fingers apart,   then bring them together as close as possible.  AROM: Composite Flesion ("Pinky Slides")  Touch thumb to tip of small finger. Slide thumb down   small finger into palm.             hold 30 seconds, repeat 3 times. Stretch should be gentle and painfree. If needed, complete with elbow bent, progressing to elbow straight as tolerable      "

## 2023-05-30 ENCOUNTER — CLINICAL SUPPORT (OUTPATIENT)
Dept: REHABILITATION | Facility: HOSPITAL | Age: 53
End: 2023-05-30
Payer: MEDICAID

## 2023-05-30 DIAGNOSIS — M79.642 PAIN IN BOTH HANDS: Primary | ICD-10-CM

## 2023-05-30 DIAGNOSIS — R20.0 NUMBNESS AND TINGLING IN BOTH HANDS: ICD-10-CM

## 2023-05-30 DIAGNOSIS — R20.2 NUMBNESS AND TINGLING IN BOTH HANDS: ICD-10-CM

## 2023-05-30 DIAGNOSIS — R53.1 WEAKNESS: ICD-10-CM

## 2023-05-30 DIAGNOSIS — M65.4 DE QUERVAIN'S TENOSYNOVITIS, BILATERAL: ICD-10-CM

## 2023-05-30 DIAGNOSIS — M25.60 STIFFNESS IN JOINT: ICD-10-CM

## 2023-05-30 DIAGNOSIS — M79.641 PAIN IN BOTH HANDS: Primary | ICD-10-CM

## 2023-05-30 PROCEDURE — 97530 THERAPEUTIC ACTIVITIES: CPT | Mod: PN

## 2023-05-30 NOTE — PROGRESS NOTES
"[facility]  Occupational Therapy Treatment Note     Date: 5/30/2023  Name: Fawn Waters  Clinic Number: [id number]    Therapy Diagnosis:   Encounter Diagnoses   Name Primary?    Pain in both hands Yes    Weakness     Stiffness in joint      Physician: Lupe Alas, KRIS    Physician Orders: Eval and Treat  Medical Diagnosis:   M65.4 (ICD-10-CM) - De Quervain's tenosynovitis, bilateral   G56.03 (ICD-10-CM) - Bilateral carpal tunnel syndrome   Date of Injury: 6 months  Evaluation Date: 5/22/2023  Insurance Authorization Period Expiration: 12/31/2023  Plan of Care Certification Period: 8/18/2023  Date of Return to MD: 6/19/2023  Visit # / Visits authorized: 1 / 1  FOTO: 2/3     Precautions:  Standard     Time In: 7:00  Time Out: 7:45  Total Appointment Time (timed & untimed codes): 45 minutes      Subjective     Pt reports: "the right wrist is still really bothering butoverall everything feels better than it did."  she was compliant with home exercise program given last session.   Response to previous treatment:decreased pain  Functional change: able to progress with exercises    Pain: 2/10  Location: right wrist      Objective     Objective Measures updated at progress report unless specified.   Observation/Appearance: +Finklestein's right; per pt report NCS reveals CTS bilateral hands     Edema. Measured in centimeters.    5/22/2023 5/22/2023         Left Right       Wrist Crease 16 16.5       Distal Palmar Crease 20 20             Elbow and Wrist ROM. Measured in degrees.    5/22/2023 5/22/2023         Left Right       Supination/Pronation WNL WNL*pain       Wrist Ext/Flex WNL WNL       Wrist RD/UD WNL WNL          Hand ROM. Full composite flexion/extension      Strength (Dynamometer) and Pinch Strength (Pinch Gauge)  Measured in pounds.    5/22/2023 5/22/2023         Left Right       Rung II 45 40       Jones Pinch 9 5       3pt Pinch 10 8          Sensation:     5/22/2023 " "5/22/2023     Left Right   [place]       Normal 1.65-2.83 x x   Diminished Light Touch 3.22-3.61       Diminished Protective 3.84-4.31       Loss of Protective 4.56-6.65       Untestable >6.65             CMS Impairment/Limitation/Restriction for FOTO Hand Survey     Therapist reviewed FOTO scores for Fawn Waters on 5/22/2023.   FOTO documents entered into [employer] - see Media section.     Limitation Score: 52%            Treatment     Fawn received the treatments listed below:     Fawn received the following supervised modalities after being cleared for contradictions for 5 minutes:   -paraffin to bilateral hands for pain management and tissue extensibility    Fawn received the following manual therapy including:  -ASTYM to bilateral hands and wrists     Fawn received therapeutic activities for 3 minutes including:  -TGE's 2/15  -thumb range of motion 2/15   -prayer stretch 3/30"   -finklestein's stretch 3/30"  -wrist 3 ways 2# 2/15  -hammer swings 2/15  -pink putty squeezes 2 minutes  -pink putty pinches 3 logs    Patient Education and Home Exercises     Education provided:   - Progress towards goals     Written Home Exercises Provided: Patient instructed to cont prior HEP.  Exercises were reviewed and Fawn was able to demonstrate them prior to the end of the session.  Fawn demonstrated good  understanding of the HEP provided.      See EMR under Patient Instructions for exercises provided prior visit.      Assessment   Pt with good participation this date. Pain report significantly improved from initial evaluation. Low throughout session. Pt able to complete all exercises in a pain free range of motion and with good technique. Issued pink putty to begin gentle strengthening home exercise program. Pt able to complete without c/o.     Fawn is progressing well towards her goals and there are no updates to goals at this time. Pt prognosis is Good.     Pt will continue to benefit from skilled " outpatient occupational therapy to address the deficits listed in the problem list on initial evaluation provide pt/family education and to maximize pt's level of independence in the home and community environment.     Anticipated barriers to occupational therapy: none    Pt's spiritual, cultural and educational needs considered and pt agreeable to plan of care and goals.    Goals:  Short term Goals:  1) Initiate home exercise program-met, ongoing  2) Pt will increase Right  strength by 5# in order to assist with functional  by 4 weeks. -Not met, progressing  3) Pt will reduce edema by .5-1 cm in affected right wrist by 4 weeks. -Not met, progressing  4) Pt will reduce pain to less than 4/10 by 4 weeks. -Not met, progressing  5) Pt will increase functional  strength by 5# in order to A in opening containers for med management or home management tasks by 4 weeks. -Not met, progressing   6) Patient will be able to achieve less than or equal to 40% on the FOTO, demonstrating overall improved functional ability with upper extremity. (Self-care category) -Not met, progressing     Long Term Goals:  Goals to be met by discharge:  1) Independent with home exercise program -Not met, progressing  2) Pt will increase Right  to equal to or greater than Left in order to increase functional use for grasp with home management or work related tasks by d/c. -Not met, progressing  3) Pt will decrease edema to trace or none to increase functional ROM by d/c. -Not met, progressing  4) Pt will decrease pain to trace or none while completing light home management tasks or work related tasks by d/c. -Not met, progressing  5) Patient will be able to achieve less than or equal to 25% on the FOTO, demonstrating overall improved functional ability with upper extremity.  (Self-care category) -Not met, progressing    Plan   Continue with OT plan of care   Updates/Grading for next session: progress as able    CARLOS Solano

## 2023-06-07 ENCOUNTER — TELEPHONE (OUTPATIENT)
Dept: REHABILITATION | Facility: HOSPITAL | Age: 53
End: 2023-06-07
Payer: MEDICAID

## 2023-06-07 NOTE — TELEPHONE ENCOUNTER
Called pt in regards to missed appointment today. Pt states she got sidetracked and completely forgot. Confirmed appointment 6/14/2023 at 1:45

## 2023-06-16 ENCOUNTER — PATIENT MESSAGE (OUTPATIENT)
Dept: PODIATRY | Facility: CLINIC | Age: 53
End: 2023-06-16
Payer: MEDICAID

## 2023-06-16 NOTE — PROGRESS NOTES
Subjective:      Patient ID: Fawn Waters is a 53 y.o. female.    Chief Complaint: Pain of the Right Hand (Patient presents today for a follow up for her bilateral hand pain. ) and Pain of the Left Hand      HPI  (French)    She has known RA and sees rheumatology at [facility].     Last seen by me on 5/8/23 for constant pain/numbness/tingling in both hands, right = left. She is right hand dominant.     Previous hand XRs looked okay. EMG showed (4/13/23) showed bilateral carpal tunnel syndrome, moderate on right and very mild on left. Exam consistent with bilateral dequervain's tenosynovitis as well.     She was given injections bilaterally for dequervain's on 5/8/23. She was given wrist braces to wear at night (did not tolerate black thumb braces). She was given relafen and sent to OT (did 2 visits). Discussed possible carpal tunnel injections at follow up.     She is here for follow up.     She did great with injections and has minimal pain in both hands! She has occasional numbness and tingling at night. Overall she is doing much better! She works in [employer] and is able to do her job with no pain! She rates her pain as a 0 on a scale of 1-10. Has thumb braces and gel wrist braces as well.     She stopped relafen due to GI upset/constipation.       Past Medical History:   Diagnosis Date    Asthma     Depression     Urinary tract infection     Vaginal infection          Current Outpatient Medications:     cetirizine (ZYRTEC) 10 MG tablet, , Disp: , Rfl:     fluoxetine (PROZAC) 20 MG capsule, , Disp: , Rfl:     hydrOXYzine HCL (ATARAX) 10 MG Tab, Take 10 mg by mouth every 8 (eight) hours as needed., Disp: , Rfl:     omeprazole (PRILOSEC) 20 MG capsule, , Disp: , Rfl:       Review of patient's allergies indicates:   Allergen Reactions    Codeine      Pt states she is in recovery and cannot take any narcotic medications       Review of Systems   Constitutional: Negative for chills, fever, night sweats and weight gain.  "  Gastrointestinal:  Negative for bowel incontinence, nausea and vomiting.   Genitourinary:  Negative for bladder incontinence.   Neurological:  Negative for disturbances in coordination and loss of balance.         Objective:        Ht 5' 9" (1.753 m)   Wt 69.3 kg (152 lb 11.2 oz)   BMI 22.55 kg/m²     Ortho/SPM Exam    RIGHT Hand/Wrist Examination:    Observation/Inspection:  Swelling  none    Deformity  none  Discoloration  none     Scars   none    Atrophy  none    HAND/WRIST EXAMINATION:  Finkelstein's Test   neg  WHAT Test    neg  Snuff box tenderness   Neg  Hook of Hamate Tenderness  Neg  CMC grind    Neg    Neurovascular Exam:  Digits WWP, brisk CR < 3s throughout  NVI motor/LTS to M/R/U nerves, radial pulse 2+  Tinel's Test - Carpal Tunnel  Neg  Tinel's Test - Cubital Tunnel  Neg  Phalen's Test    neg  Median Nerve Compression Test neg    ROM hand/wrist/elbow full, painless    She has very minimal tenderness over first dorsal compartment.       LEFT Hand/Wrist Examination:    Observation/Inspection:  Swelling  none    Deformity  none  Discoloration  none     Scars   none    Atrophy  none    HAND/WRIST EXAMINATION:  Finkelstein's Test   neg  WHAT Test    candice  Snuff box tenderness   Neg  Hook of Hamate Tenderness  Neg  CMC grind    Neg    Neurovascular Exam:  Digits WWP, brisk CR < 3s throughout  NVI motor/LTS to M/R/U nerves, radial pulse 2+  Tinel's Test - Carpal Tunnel  Neg  Tinel's Test - Cubital Tunnel  Neg  Phalen's Test    neg  Median Nerve Compression Test neg    ROM hand/wrist/elbow full, painless    She has very minimal tenderness over first dorsal compartment.         Assessment:       Encounter Diagnoses   Name Primary?    De Quervain's tenosynovitis, bilateral Yes    Bilateral carpal tunnel syndrome               Plan:       Fawn HEAD was seen today for pain and pain.    Diagnoses and all orders for this visit:    De Quervain's tenosynovitis, bilateral    Bilateral carpal tunnel " syndrome        She has known RA and sees rheumatology at [facility].     Great improvement in pain after injections at last visit. She has minimal pain in hands and only occasional numbness/tingling. 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 She is right hand dominant.     Previous XRs of both hands showed no fracture or dislocation. Exam is consistent with bilateral dequervain's tenosynovitis as well carpal tunnel syndrome.     EMG (4/13/23) showed bilateral carpal tunnel syndrome, moderate on right and very mild on left.     Treatment options reviewed with patient and following plan made:     - Finish out OT if able and continue with HEP.   - She can continue with gel wrist braces at night.   - Hold on further NSAIDs as she is doing well. Constipated with relafen so she stopped it.   - If numbness/tingling get worse, consider carpal tunnel injections.   - Briefly discussed surgery for dequervain's and carpal tunnel.   - Recall visit in 2 months since I will be gone.     Follow up in about 2 months (around 8/19/2023).

## 2023-06-19 ENCOUNTER — OFFICE VISIT (OUTPATIENT)
Dept: ORTHOPEDICS | Facility: CLINIC | Age: 53
End: 2023-06-19
Payer: MEDICAID

## 2023-06-19 VITALS — BODY MASS INDEX: 22.62 KG/M2 | WEIGHT: 152.69 LBS | HEIGHT: 69 IN

## 2023-06-19 DIAGNOSIS — G56.03 BILATERAL CARPAL TUNNEL SYNDROME: ICD-10-CM

## 2023-06-19 DIAGNOSIS — M65.4 DE QUERVAIN'S TENOSYNOVITIS, BILATERAL: Primary | ICD-10-CM

## 2023-06-19 PROCEDURE — 3008F PR BODY MASS INDEX (BMI) DOCUMENTED: ICD-10-PCS | Mod: CPTII,,, | Performed by: PHYSICIAN ASSISTANT

## 2023-06-19 PROCEDURE — 99999 PR PBB SHADOW E&M-EST. PATIENT-LVL III: CPT | Mod: PBBFAC,,, | Performed by: PHYSICIAN ASSISTANT

## 2023-06-19 PROCEDURE — 99213 OFFICE O/P EST LOW 20 MIN: CPT | Mod: S$PBB,,, | Performed by: PHYSICIAN ASSISTANT

## 2023-06-19 PROCEDURE — 1160F PR REVIEW ALL MEDS BY PRESCRIBER/CLIN PHARMACIST DOCUMENTED: ICD-10-PCS | Mod: CPTII,,, | Performed by: PHYSICIAN ASSISTANT

## 2023-06-19 PROCEDURE — 1159F PR MEDICATION LIST DOCUMENTED IN MEDICAL RECORD: ICD-10-PCS | Mod: CPTII,,, | Performed by: PHYSICIAN ASSISTANT

## 2023-06-19 PROCEDURE — 1159F MED LIST DOCD IN RCRD: CPT | Mod: CPTII,,, | Performed by: PHYSICIAN ASSISTANT

## 2023-06-19 PROCEDURE — 99213 OFFICE O/P EST LOW 20 MIN: CPT | Mod: PBBFAC,PN | Performed by: PHYSICIAN ASSISTANT

## 2023-06-19 PROCEDURE — 99999 PR PBB SHADOW E&M-EST. PATIENT-LVL III: ICD-10-PCS | Mod: PBBFAC,,, | Performed by: PHYSICIAN ASSISTANT

## 2023-06-19 PROCEDURE — 1160F RVW MEDS BY RX/DR IN RCRD: CPT | Mod: CPTII,,, | Performed by: PHYSICIAN ASSISTANT

## 2023-06-19 PROCEDURE — 99213 PR OFFICE/OUTPT VISIT, EST, LEVL III, 20-29 MIN: ICD-10-PCS | Mod: S$PBB,,, | Performed by: PHYSICIAN ASSISTANT

## 2023-06-19 PROCEDURE — 3008F BODY MASS INDEX DOCD: CPT | Mod: CPTII,,, | Performed by: PHYSICIAN ASSISTANT

## 2023-10-16 ENCOUNTER — TELEPHONE (OUTPATIENT)
Dept: PODIATRY | Facility: CLINIC | Age: 53
End: 2023-10-16
Payer: MEDICAID

## 2023-10-16 NOTE — TELEPHONE ENCOUNTER
Called pt to schedule appointment for right foot pain . Explained to her the first appointment is on 11/14 at 1:30. Pt agreed and gave verbal understanding.call ended ----- Message from Lilly Miranda sent at 10/16/2023  3:19 PM CDT -----  .Type:  Sooner Apoointment Request    Caller is requesting a sooner appointment.  Caller declined first available appointment listed below.  Caller will not accept being placed on the waitlist and is requesting a message be sent to doctor.  Name of Caller:pt  When is the first available appointment?1/4  Symptoms:pain in right foot  Would the patient rather a call back or a response via [employer]? Call back  Best Call Back Number:[phone]  Additional Information:

## 2023-11-14 ENCOUNTER — TELEPHONE (OUTPATIENT)
Dept: PODIATRY | Facility: CLINIC | Age: 53
End: 2023-11-14
Payer: MEDICAID

## 2023-11-14 ENCOUNTER — OFFICE VISIT (OUTPATIENT)
Dept: PODIATRY | Facility: CLINIC | Age: 53
End: 2023-11-14
Payer: MEDICAID

## 2023-11-14 VITALS — BODY MASS INDEX: 22.53 KG/M2 | WEIGHT: 152.13 LBS | HEIGHT: 69 IN

## 2023-11-14 DIAGNOSIS — M79.89 PALPABLE MASS OF SOFT TISSUE OF FOOT: Primary | ICD-10-CM

## 2023-11-14 DIAGNOSIS — M21.611 BUNION OF RIGHT FOOT: ICD-10-CM

## 2023-11-14 DIAGNOSIS — M79.671 RIGHT FOOT PAIN: Primary | ICD-10-CM

## 2023-11-14 PROCEDURE — 1159F PR MEDICATION LIST DOCUMENTED IN MEDICAL RECORD: ICD-10-PCS | Mod: CPTII,,, | Performed by: PODIATRIST

## 2023-11-14 PROCEDURE — 99213 OFFICE O/P EST LOW 20 MIN: CPT | Mod: PBBFAC,PN | Performed by: PODIATRIST

## 2023-11-14 PROCEDURE — 1160F PR REVIEW ALL MEDS BY PRESCRIBER/CLIN PHARMACIST DOCUMENTED: ICD-10-PCS | Mod: CPTII,,, | Performed by: PODIATRIST

## 2023-11-14 PROCEDURE — 1160F RVW MEDS BY RX/DR IN RCRD: CPT | Mod: CPTII,,, | Performed by: PODIATRIST

## 2023-11-14 PROCEDURE — 99213 PR OFFICE/OUTPT VISIT, EST, LEVL III, 20-29 MIN: ICD-10-PCS | Mod: S$PBB,,, | Performed by: PODIATRIST

## 2023-11-14 PROCEDURE — 3008F BODY MASS INDEX DOCD: CPT | Mod: CPTII,,, | Performed by: PODIATRIST

## 2023-11-14 PROCEDURE — 1159F MED LIST DOCD IN RCRD: CPT | Mod: CPTII,,, | Performed by: PODIATRIST

## 2023-11-14 PROCEDURE — 99999 PR PBB SHADOW E&M-EST. PATIENT-LVL III: ICD-10-PCS | Mod: PBBFAC,,, | Performed by: PODIATRIST

## 2023-11-14 PROCEDURE — 3008F PR BODY MASS INDEX (BMI) DOCUMENTED: ICD-10-PCS | Mod: CPTII,,, | Performed by: PODIATRIST

## 2023-11-14 PROCEDURE — 99999 PR PBB SHADOW E&M-EST. PATIENT-LVL III: CPT | Mod: PBBFAC,,, | Performed by: PODIATRIST

## 2023-11-14 PROCEDURE — 99213 OFFICE O/P EST LOW 20 MIN: CPT | Mod: S$PBB,,, | Performed by: PODIATRIST

## 2023-11-14 RX ORDER — MELOXICAM 7.5 MG/1
7.5 TABLET ORAL DAILY
Qty: 30 TABLET | Refills: 0 | Status: SHIPPED | OUTPATIENT
Start: 2023-11-14

## 2023-11-14 NOTE — PATIENT INSTRUCTIONS
Bunions  Even though bunions are a common foot deformity, there are misconceptions about them. Many people may unnecessarily suffer the pain of bunions for years before seeking treatment.    What Is a Bunion?          A bunion (also referred to as hallux valgus) is often described as a bump on the side of the big toe. But a bunion is more than that. The visible bump actually reflects changes in the bony framework of the front part of the foot. The big toe leans toward the second toe, rather than pointing straight ahead. This throws the bones out of alignment--producing the bunions bump.        Bunions are a progressive disorder. They begin with a leaning of the big toe, gradually changing the angle of the bones over the years and slowly producing the characteristic bump, which becomes increasingly prominent. Symptoms usually appear at later stages, although some people never have symptoms.    Causes  Bunions are most often caused by an inherited faulty mechanical structure of the foot. It is not the bunion itself that is inherited but certain foot types that make a person prone to developing a bunion.    Although wearing shoes that crowd the toes will not actually cause bunions, it sometimes makes the deformity get progressively worse. Symptoms may therefore appear sooner.    Symptoms  Symptoms, which occur at the site of the bunion, may include:    Pain or soreness  Inflammation and redness  A burning sensation  Possible numbness    Symptoms occur most often when wearing shoes that crowd the toes, such as shoes with a tight toe box or high heels. This may explain why women are more likely to have symptoms than men. In addition, spending long periods of time on your feet can aggravate the symptoms of bunions.    Diagnosis  Diagram indicating location of bunion on a footBunions are readily apparent--the prominence is visible at the base of the big toe or side of the foot. However, to fully evaluate the condition,  the foot and ankle surgeon may take x-rays to determine the degree of the deformity and assess the changes that have occurred.    Because bunions are progressive, they do not go away and will usually get worse over time. But not all cases are alike--some bunions progress more rapidly than others. Once your surgeon has evaluated your bunion, a treatment plan can be developed that is suited to your needs.    Nonsurgical Treatment  Sometimes observation of the bunion is all that is needed. To reduce the chance of damage to the joint, periodic evaluation and x-rays by your surgeon are advised.    In many other cases, however, some type of treatment is needed. Early treatments are aimed at easing the pain of bunions, but they will not reverse the deformity itself. These include:    Changes in shoewear. Wearing the right kind of shoes is very important. Choose shoes that have a wide toe box and forgo those with pointed toes or high heels, which may aggravate the condition.  Padding. Pads placed over the area of the bunion can help minimize pain. These can be obtained from your surgeon or purchased at a drug store.  Activity modifications. Avoid activity that causes bunion pain, including standing for long periods of time.  Medications. Oral nonsteroidal anti-inflammatory drugs (NSAIDs), such as ibuprofen, may be recommended to reduce pain and inflammation.  Icing. Applying an ice pack several times a day helps reduce inflammation and pain.  Injection therapy. Although rarely used in bunion treatment, injections of corticosteroids may be useful in treating the inflamed bursa (fluid-filled sac located around a joint) sometimes seen with bunions.  Orthotic devices. In some cases, custom orthotic devices may be provided by the foot and ankle surgeon.      Recommended OTC orthotics:  -powerstep  -superfeet    Recommended shoegear:  -new balance  -ascics  -mizuno  -barboza       When Is Surgery Needed?  If nonsurgical treatments  fail to relieve bunion pain and when the pain of a bunion interferes with daily activities, it is time to discuss surgical options with a foot and ankle surgeon. Together you can decide if surgery is best for you.    A variety of surgical procedures is available to treat bunions. The procedures are designed to remove the bump of bone, correct the changes in the bony structure of the foot and correct soft tissue changes that may also have occurred. The goal of surgery is the reduction of pain and deformity.    In selecting the procedure or combination of procedures for your particular case, the foot and ankle surgeon will take into consideration the extent of your deformity based on the x-ray findings, your age, your activity level and other factors. The length of the recovery period will vary, depending on the procedure or procedures performed.                                          Bunion    You have a bunion. This is a bony bump at the base of your big toe, along the inside edge of your foot. As the bump gets bigger, it can become red, swollen, and painful with shoe wear.  Bunions may occur if you wear shoes that are too tight and pinch your toes together. High heels may make this worse. In some cases a bunion is due to poor alignment of the foot and ankle. This puts extra weight on the instep of each foot.  Once a bunion forms, it changes the way weight is spread all across your foot. This causes the bunion to get worse over time. The big toe will bend more and more toward the other toes.  A minor bunion can be treated by:  Wearing properly fitting shoes  Using bunion pads  Wearing shoe inserts, called orthotics, to better align the foot and ankle  Physical therapy with ultrasound or whirlpool baths can ease pain, redness, and swelling. Severe cases may require surgery. If you dont treat what is causing the bunion, it may get larger and more painful.  Home care  Limit high heels. These shoes force your foot  forward, crowding the toes together.  Switch to comfortable shoes with a wide toe area. Or have your existing shoes stretched by a shoe repair shop.  Avoid shoes that are tight, narrow, or pointed.  If you are flat-footed, using arch supports may help prevent further deformity. The best shoe inserts are the ones custom made by a foot specialist, called a podiatrist, or other healthcare provider.  Put a bunion pad over the bunion to ease pressure of your shoe against the bunion. You can buy these pads at most pharmacies without a prescription  To reduce pain and swelling, apply an ice pack over the injured area for 15 to 20 minutes. Do this every 1 to 2 hours the first day. Keep using ice 3 to 4 times a day until the pain and swelling goes away.  To make an ice pack, put ice cubes in a sealed zip-lock plastic bag. Wrap the bag in a clean, thin towel or cloth. Never put ice or an ice pack directly on the skin.  You may use over-the-counter pain medicine to control pain, unless another medicine was prescribed. Talk with your provider before using these medicines if you have chronic liver or kidney disease, or ever had a stomach ulcer or GI (gastrointestinal) bleeding.    Follow-up care  Follow up with a podiatrist or foot doctor, or as advised.  If X-rays were taken, you will be notified of any new findings that may affect your care.  When to seek medical care  Contact your healthcare provider if any of the following occur:  Increasing pain or redness around the base of the big toe  Painful ingrown toenail, with redness and swelling or pus around the nail  Date Last Reviewed: 11/21/2015  © 8655-5053 [employer]. [address] [place] [address]. All rights reserved. This information is not intended as a substitute for professional medical care. Always follow your healthcare professional's instructions.        Treating Bunions  Although a bunion wont go away, wearing shoes that fit properly will  "often relieve the pain. Padding and icing the bunion may also help. Bunions that remain painful may need surgery.     Heels: Heel height should be low. The back of the shoe should  your heel firmly so the shoe doesn't flop when you walk.         Toes: There should be 1/2" between your longest toe and the tip of the shoe. The shoe should be wide enough for you to wiggle your toes.    Shoes  To relieve a bunion, you dont have to buy shoes that are ugly or out of fashion. But follow these tips:  Shop for shoes late in the day. This is when your feet are the largest.  Have both feet measured often. Fit shoes to your larger foot.  Look for shoes that have the same shape as your foot but are slightly wider across the toes.  Choose low-heeled shoes.  Always try shoes on. Stand up and walk around. If the shoes arent comfortable, dont buy them.  Ice massage  To help relieve a painful bunion, put an ice cube in a plastic bag. Rub the ice on the bunion for 5 minutes. Repeat 2 to 3 times a day.  Pads  You may want to put a pad over the bunion to cushion it. You can buy bunion pads at most [facility].  Surgery  Wearing wider shoes and padding the bunion may not relieve the pain. Your healthcare provider may then suggest surgery. During surgery, the bunion is shaved away and the bones are put back in a straight line.   Date Last Reviewed: 9/27/2015 © 2000-2016 [employer]. [address] [place] [address], [place] [address]. All rights reserved. This information is not intended as a substitute for professional medical care. Always follow your healthcare professional's instructions.      Osteotomy and Ligament or Tendon Repair (Bunion Surgery)  Osteotomy and ligament or tendon repair is a type of bunion surgery. A bunion is a bony bump (growth) at the base of your big toe. This growth can form when your big toe pushes against your next toe. A bunion can cause pain, swelling, redness, and other symptoms. During this " surgery, bone is removed from your toe. Nearby tendons and ligaments are made shorter or longer as needed. This allows your big toe to line up (align) properly.    Preparing for surgery  Follow any instructions from your healthcare provider.  Tell your surgeon about any medicines you are taking. You may need to stop taking all or some of these before the procedure. This includes:  All prescription medicines  Over-the-counter medicines such as aspirin or ibuprofen  Street drugs  Herbs, vitamins, and other supplements  Also, follow any directions youre given for not eating or drinking before surgery.  The day of surgery  The surgery takes at least 60 minutes. You will likely go home the same day.  Before the surgery begins  An IV (intravenous) line is put into a vein in your arm or hand. This line gives you fluids and medicines.  You may be given medicine to help you relax (sedation). To keep you free of pain during the surgery, you may have medicine to block the nerves in your foot. Or, you will be given general anesthesia. This puts you into a deep sleep.  During the surgery  A cut (incision) is made on your foot to expose the bunion bump, and the tendons and ligaments around it. The tendons and ligaments that are tight are cut (released).  The bunion bump is removed with a bone saw. Your big toe bone or the main bone in your foot is shortened and realigned. A pin, screw, or plate is used to hold your toe and foot bones together.  The nearby tendons and ligaments may be tightened. If there is extra tissue, it is removed and the ends are stitched (sutured) together. The incision in your skin is then closed with sutures. Your foot is bandaged.  After the surgery  Youll be taken to a recovery room. You may have medicines to manage pain. You may wear a brace, surgical shoe, or cast to protect your foot while it heals. The surgeon will tell you when you can go home. Have an adult family member or friend drive  you.  Recovering at home  Once home, follow any instructions you are given. During your recovery:  Take pain medicine exactly as directed.  To prevent swelling, sit or lie with your leg raised on one or more pillows. Do this for the first 2 days.  Follow your surgeon's instructions about putting weight on your foot after the surgery. You may need to use a walker, cane, or crutches for a time.  You may wear a brace, surgical shoe, or cast for up to a month or longer. Care for this as instructed. Keep it dry by wrapping it in plastic bags when bathing.  Avoid sports and other activities until your surgeon says its OK.  Care for your incision as instructed.  Don't drive until your surgeon says its OK.  Call your surgeon if you have any of the following:  Chest pain or trouble breathing  Fever of 100.4°F (38°C) or higher, or as directed by your surgeon  Pain that isnt helped by medicine or rest  Increased swelling not helped by raising or icing your foot  Signs of infection at any incision site, such as increased redness or swelling, warmth, more pain, or bad-smelling drainage  Bleeding through the bandages  Symptoms of poor circulation, such as toes that look blue instead of pinkish  Numbness that doesnt go away  Any other signs or symptoms indicated by your surgeon   Follow-up  Keep all follow-up appointments with your surgeon. These are to check that you are healing well from the surgery. You may have X-rays to check how the bone is healing. Physical therapy, foot exercises, and other treatments may be discussed at follow-up visits. Full recovery can take at least a few months.  Risks and possible complications include:  Infection  Bleeding  Sensitivity at the incision site for months after the surgery  Foot pain that doesnt go away after surgery  Numbness in the foot  Only partial relief of symptoms, or no relief of symptoms  Return of the bunion  Risks of anesthesia (the anesthesiologist will discuss these  with you)  Poor wound healing  Breakage of screws or pins  A lot of scarring   Date Last Reviewed: 7/28/2015  © 6586-4660 [employer]. [address] [place] [address], [place] [address]. All rights reserved. This information is not intended as a substitute for professional medical care. Always follow your healthcare professional's instructions.          Recommended OTC orthotics:  -powerstep  -superfeet    Recommended shoegear:  -new balance  -ascics  -tushar gardner

## 2023-11-14 NOTE — PROGRESS NOTES
[facility] [place] - PODIATRY  [id number] [place] [address], [address]  [facility] [address]  Dept: [phone]  Dept Fax: [phone]    John Hercules Jr., SULLY     Assessment:   MDM    Coding  1. Palpable mass of soft tissue of foot - Right Foot  MRI Foot (Hindfoot) Right W W/O Contrast    meloxicam (MOBIC) 7.5 MG tablet      2. Bunion of right foot            Plan:     Procedures    Fawn HEAD was seen today for foot pain.    Diagnoses and all orders for this visit:    Palpable mass of soft tissue of foot - Right Foot  -     MRI Foot (Hindfoot) Right W W/O Contrast; Future  -     meloxicam (MOBIC) 7.5 MG tablet; Take 1 tablet (7.5 mg total) by mouth once daily.    Bunion of right foot      -pt seen, evaluated, and managed  -dx discussed in detail. All questions/concerns addressed  -all tx options discussed. All alternatives, risks, benefits of all txs discussed  -Pt has failed conservative care options possible including but not limited to shoe wear and/or padding, bracing/strapping, at home ROM, formal PT, medical therapy, injection therapy  -XR/imaging reviewed by me: agree with read  -cont icing/stretching regimen  -MRI ordered to better characterize    -rxs dispensed: mobic  -referrals: none  -WB: wbat      Follow up in about 4 weeks (around 12/12/2023).    Subjective:      Patient ID: Fawn Waters is a 53 y.o. female.    Chief Complaint:   Chief Complaint   Patient presents with    Foot Pain     Bottom right foot        Patient complains of right bunions. Symptoms began a few month ago. Notes progressive deformity of the right great toe.  Complains of associated pain.  Severity = moderate  Notes excessive wear on shoes.  Symptoms having impact on normal day to day activities. Patient's symptoms have gradually worsened. Treatment thus far has included trial of new shoes, which have been ineffective.  OTC inserts, which have been ineffective.  OTC analgesics, which have been somewhat effective..  Aggravating factors: walking and certain shoegear. Evaluation to date: none. Patients rates pain 8/10 on pain scale.      22:  Hx as above. Bunion pain has resolved. Reports painful growing mass R plantar foot.        Last Podiatry Enc: Visit date not found  Last Enc w/ Me: Visit date not found    Outside reports reviewed: historical medical records.  Family hx: as below  Past Medical History:   Diagnosis Date    Asthma     Depression     Urinary tract infection     Vaginal infection      Past Surgical History:   Procedure Laterality Date    TUBAL LIGATION       Family History   Problem Relation Age of Onset    Breast cancer Mother     Kidney disease Neg Hx      Current Outpatient Medications   Medication Sig Dispense Refill    cetirizine (ZYRTEC) 10 MG tablet       fluoxetine (PROZAC) 20 MG capsule       hydrOXYzine HCL (ATARAX) 10 MG Tab Take 10 mg by mouth every 8 (eight) hours as needed.      omeprazole (PRILOSEC) 20 MG capsule       meloxicam (MOBIC) 7.5 MG tablet Take 1 tablet (7.5 mg total) by mouth once daily. 30 tablet 0     No current facility-administered medications for this visit.     Review of patient's allergies indicates:   Allergen Reactions    Codeine      Pt states she is in recovery and cannot take any narcotic medications     Social History     Socioeconomic History    Marital status: Single   Tobacco Use    Smoking status: Former     Types: Vaping with nicotine     Quit date: 2017     Years since quittin.7    Smokeless tobacco: Never   Substance and Sexual Activity    Alcohol use: No    Drug use: No    Sexual activity: Yes     Partners: Male       ROS    REVIEW OF SYSTEMS: Negative as documented below as well as positive findings in bold.       Constitutional  Respiratory  Gastrointestinal  Skin   - Fever - Cough - Heartburn - Rash   - Chills - Spit blood - Nausea - Itching   - Weight Loss - Shortness of breath - Vomiting - Nail pain   - Malaise/Fatigue - Wheezing - Abdominal  "Pain  Wound/Ulcer   - Weight Gain   - Blood in Stool  Poor wound healing       - Diarrhea          Cardiovascular  Genitourinary  Neurological  HEENT   - Chest Pain - Dysuria - Burning Sensation of feet - Headache   - Palpitations - Hematuria - Tingling / Paresthesia - Congestion   - Pain at night in legs - Flank Pain - Dizziness - Sore Throat   - Cramping   - Tremor - Blurred Vision   - Leg Swelling   - Sensory Change - Double Vision   - Dizzy when standing   - Speech Change - Eye Redness       - Focal Weakness - Dry Eyes       - Loss of Consciousness          Endocrine  Musculoskeletal  Psychiatric   - Cold intolerance - Muscle Pain - Depression   - Heat intolerance - Neck Pain - Insomnia   - Anemia - Joint Pain - Memory Loss   -  Easy bruising, bleeding - Heel pain - Anxiety      Toe Pain        Leg/Ankle/Foot Pain         Objective:     Ht 5' 9" (1.753 m)   Wt 69 kg (152 lb 1.9 oz)   BMI 22.46 kg/m²   Vitals:    11/14/23 1413   Weight: 69 kg (152 lb 1.9 oz)   Height: 5' 9" (1.753 m)   PainSc: 0-No pain         Physical Exam    General Appearance:   Patient appears well developed, well nourished  Patient appears stated age    Psychiatric:   Patient is oriented to time, place, and person.  Patient has appropriate mood and affect    Neck:  Trachea Midline  No visible masses    Respiratory/Ears:  No distress or labored breathing.  Able to differentiate between normal talking voice and whisper.  Able to follow commands    Eyes:  Visual Acuity intact  Lids and conjunctivae normal. No discoloration noted.    Foot Exam  Physical Exam  Ortho Exam  Ortho/SPM Exam  Physical Exam  Neurologic Exam    R LE exam con't:  V:  DP 2/4, PT 2/4   CRT< 3s to all digits tested   Tibial and popliteal lymph nodes are w/o abnormality    Edema: absent, varicosities: absent    N:  Patient displays normal ankle reflexes   SILT in SP/DP/T/Yandel/Saph distributions    Ortho: +Motor EHL/FHL/TA/GA   observed mild lateral deviation of the hallux " with bunion deformity present R and +midfoot instability noted    Enlarged bony prominence present at the dorsal aspect of the 1st metatarsal head R foot   +soft tissue mass medial plantar foot at heel: firm, well circumscribed   There is mild decreased ROM at the first MTP joints with pain and w/o crepitus.   There is moderate pain with palpation of dorsal bump of the R 1st MPJ  Compartments soft/compressible. No pain on passive stretch of big toe. No calf  Pain.    Derm:  skin intact, skin warm and dry, skin without ulcers or lesions, skin without induration, nails normal      Imaging / Labs:      X-Ray Foot Complete 3 view Bilateral    Result Date: 10/18/2022  EXAMINATION: XR FOOT COMPLETE 3 VIEW BILATERAL CLINICAL HISTORY: Pain in right foot TECHNIQUE: AP, lateral, and oblique views of both feet were performed. COMPARISON: 11/18/2019 FINDINGS: Right: There is no radiographic evidence of acute osseous, articular, or soft tissue abnormality.  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  No osseous erosions visualized. Left: There is no radiographic evidence of acute osseous, articular, or soft tissue abnormality.  Joint spaces are preserved.     As above.  No acute findings. Electronically signed by: Paramjit Jon MD Date:    10/18/2022 Time:    10:55         Note: This was dictated using a computer transcription program. Although proofread, it may contain computer transcription errors and phonetic errors. Other human proofreading errors may also exist. Corrections may be performed at a later time. Please contact us for any clarification if needed.    John Hercules DPM  [facility]

## 2023-12-04 ENCOUNTER — HOSPITAL ENCOUNTER (OUTPATIENT)
Dept: RADIOLOGY | Facility: HOSPITAL | Age: 53
Discharge: HOME | End: 2023-12-04
Attending: PODIATRIST
Payer: MEDICAID

## 2023-12-04 DIAGNOSIS — M79.89 PALPABLE MASS OF SOFT TISSUE OF FOOT: ICD-10-CM

## 2023-12-04 PROCEDURE — A9585 GADOBUTROL INJECTION: HCPCS | Performed by: PODIATRIST

## 2023-12-04 PROCEDURE — 25500020 PHARM REV CODE 255: Performed by: PODIATRIST

## 2023-12-04 PROCEDURE — 73720 MRI LWR EXTREMITY W/O&W/DYE: CPT | Mod: 26,RT,, | Performed by: RADIOLOGY

## 2023-12-04 PROCEDURE — 73720 MRI FOOT (HINDFOOT) RIGHT W W/O CONTRAST: ICD-10-PCS | Mod: 26,RT,, | Performed by: RADIOLOGY

## 2023-12-04 PROCEDURE — 73720 MRI LWR EXTREMITY W/O&W/DYE: CPT | Mod: TC,RT

## 2023-12-04 RX ORDER — GADOBUTROL 604.72 MG/ML
6 INJECTION INTRAVENOUS
Status: COMPLETED | OUTPATIENT
Start: 2023-12-04 | End: 2023-12-04

## 2023-12-04 RX ADMIN — GADOBUTROL 6 ML: 604.72 INJECTION INTRAVENOUS at 07:12

## 2023-12-14 ENCOUNTER — OFFICE VISIT (OUTPATIENT)
Dept: PODIATRY | Facility: CLINIC | Age: 53
End: 2023-12-14
Payer: MEDICAID

## 2023-12-14 VITALS — BODY MASS INDEX: 22.53 KG/M2 | HEIGHT: 69 IN | WEIGHT: 152.13 LBS

## 2023-12-14 DIAGNOSIS — M72.2 PLANTAR FASCIITIS: ICD-10-CM

## 2023-12-14 DIAGNOSIS — M79.89 PALPABLE MASS OF SOFT TISSUE OF FOOT: Primary | ICD-10-CM

## 2023-12-14 PROCEDURE — 3008F BODY MASS INDEX DOCD: CPT | Mod: CPTII,,, | Performed by: PODIATRIST

## 2023-12-14 PROCEDURE — 3008F PR BODY MASS INDEX (BMI) DOCUMENTED: ICD-10-PCS | Mod: CPTII,,, | Performed by: PODIATRIST

## 2023-12-14 PROCEDURE — 99999PBSHW PR PBB SHADOW TECHNICAL ONLY FILED TO HB: Mod: PBBFAC,,,

## 2023-12-14 PROCEDURE — 99999PBSHW PR PBB SHADOW TECHNICAL ONLY FILED TO HB: ICD-10-PCS | Mod: PBBFAC,,,

## 2023-12-14 PROCEDURE — 99213 PR OFFICE/OUTPT VISIT, EST, LEVL III, 20-29 MIN: ICD-10-PCS | Mod: S$PBB,,, | Performed by: PODIATRIST

## 2023-12-14 PROCEDURE — 99212 OFFICE O/P EST SF 10 MIN: CPT | Mod: PBBFAC,PN | Performed by: PODIATRIST

## 2023-12-14 PROCEDURE — 99213 OFFICE O/P EST LOW 20 MIN: CPT | Mod: S$PBB,,, | Performed by: PODIATRIST

## 2023-12-14 PROCEDURE — 99999 PR PBB SHADOW E&M-EST. PATIENT-LVL II: CPT | Mod: PBBFAC,,, | Performed by: PODIATRIST

## 2023-12-14 PROCEDURE — 99999 PR PBB SHADOW E&M-EST. PATIENT-LVL II: ICD-10-PCS | Mod: PBBFAC,,, | Performed by: PODIATRIST

## 2023-12-14 RX ORDER — DEXAMETHASONE SODIUM PHOSPHATE 4 MG/ML
4 INJECTION, SOLUTION INTRA-ARTICULAR; INTRALESIONAL; INTRAMUSCULAR; INTRAVENOUS; SOFT TISSUE
Status: DISCONTINUED | OUTPATIENT
Start: 2023-12-14 | End: 2023-12-14 | Stop reason: HOSPADM

## 2023-12-14 RX ADMIN — DEXAMETHASONE SODIUM PHOSPHATE 4 MG: 4 INJECTION, SOLUTION INTRAMUSCULAR; INTRAVENOUS at 01:12

## 2023-12-14 NOTE — PROGRESS NOTES
[facility] - POD[facility]ATRY  [address] [facility]  [address]  [facility] [address]  Dept: [phone]  Dept Fax: [phone]    John Hercules Jr., DPM     Assessment:   MDM    Coding  1. Palpable mass of soft tissue of foot - Right Foot  Tendon Sheath      2. Plantar fasciitis - Right Foot  Tendon Sheath          Plan:     Tendon Sheath    Date/Time: 12/14/2023 1:15 PM    Performed by: John Hercules Jr., DPM  Authorized by: John Hercules Jr., DPM    Consent Done?:  Yes (Verbal)  Indications:  Pain  Site marked: the procedure site was marked    Timeout: prior to procedure the correct patient, procedure, and site was verified    Prep: patient was prepped and draped in usual sterile fashion      Local anesthesia used?: Yes    Anesthesia:  Local infiltration  Local anesthetic:  Co-phenylcaine spray and bupivacaine 0.5% without epinephrine  Anesthetic total (ml):  2    Location:  Foot  Foot joint: R PF.  Ultrasonic guidance for needle placement?: No    Needle size:  25 G  Approach:  Medial  Medications:  4 mg dexAMETHasone 4 mg/mL  Patient tolerance:  Patient tolerated the procedure well with no immediate complications      Diagnoses and all orders for this visit:    Palpable mass of soft tissue of foot - Right Foot  -     Tendon Sheath    Plantar fasciitis - Right Foot  -     Tendon Sheath      -pt seen, evaluated, and managed  -dx discussed in detail. All questions/concerns addressed  -all tx options discussed. All alternatives, risks, benefits of all txs discussed  -the patient was educated about the diagnosis  -We discussed conservative care options possible including but not limited to shoe wear and/or padding, bracing/strapping, at home ROM, formal PT, medical therapy, injection therapy  - The utilization of NSAIDs can be considered but their benefit has to be tempered against the risk of GI/ concerns  - A steroid injection can be undertaken.  We did discuss the potential mechanism of action of  this shot.  Understanding that multiple injections at the same anatomic site do have deleterious effects on the soft tissue.  Generic risks include: steroid flare (advised to ice if necessary), skin hypo-pgimentation (which can be permanent and unsightly), elevation of blood sugar, subcutaneous atrophy (can be permanent) and infection.   -XR/imaging reviewed by me: agree with read  -labs reviewed by me: ok for vgel  -implemented icing/stretching regimen  -heel lifts dispensed    -rxs dispensed: none  -referrals: none  -WB: wbat      Follow up in about 8 weeks (around 2/8/2024).    Subjective:      Patient ID: Fawn Waters is a 53 y.o. female.    Chief Complaint: No chief complaint on file.      CC - foot pain: patient presents to the podiatry clinic  with complaint of  right foot pain. Onset of the symptoms was several months ago. Precipitating event: unk. Current symptoms include: ability to bear weight, but with some pain, su the heel, swelling and worsening symptoms after a period of inactivity, small mass in foot. Aggravating factors: walking and certain shoegear. Symptoms have gradually worsened. Patient has had no prior foot problems. Evaluation to date: none. Treatment to date: avoidance of offending activity. Patients rates pain 7/10 on pain scale.      HPI    Last Podiatry Enc: 11/14/2023  Last Enc w/ Me: 11/14/2023    Outside reports reviewed: historical medical records.  Family hx: as below  Past Medical History:   Diagnosis Date    Asthma     Depression     Urinary tract infection     Vaginal infection      Past Surgical History:   Procedure Laterality Date    TUBAL LIGATION       Family History   Problem Relation Age of Onset    Breast cancer Mother     Kidney disease Neg Hx      Current Outpatient Medications   Medication Sig Dispense Refill    cetirizine (ZYRTEC) 10 MG tablet       fluoxetine (PROZAC) 20 MG capsule       hydrOXYzine HCL (ATARAX) 10 MG Tab Take 10 mg by mouth every 8  "(eight) hours as needed.      meloxicam (MOBIC) 7.5 MG tablet Take 1 tablet (7.5 mg total) by mouth once daily. 30 tablet 0    omeprazole (PRILOSEC) 20 MG capsule        No current facility-administered medications for this visit.     Review of patient's allergies indicates:   Allergen Reactions    Codeine      Pt states she is in recovery and cannot take any narcotic medications     Social History     Socioeconomic History    Marital status: Single   Tobacco Use    Smoking status: Former     Types: Vaping with nicotine     Quit date: 2017     Years since quittin.8    Smokeless tobacco: Never   Substance and Sexual Activity    Alcohol use: No    Drug use: No    Sexual activity: Yes     Partners: Male       ROS    REVIEW OF SYSTEMS: Negative as documented below as well as positive findings in bold.       Constitutional  Respiratory  Gastrointestinal  Skin   - Fever - Cough - Heartburn - Rash   - Chills - Spit blood - Nausea - Itching   - Weight Loss - Shortness of breath - Vomiting - Nail pain   - Malaise/Fatigue - Wheezing - Abdominal Pain  Wound/Ulcer   - Weight Gain   - Blood in Stool  Poor wound healing       - Diarrhea          Cardiovascular  Genitourinary  Neurological  HEENT   - Chest Pain - Dysuria - Burning Sensation of feet - Headache   - Palpitations - Hematuria - Tingling / Paresthesia - Congestion   - Pain at night in legs - Flank Pain - Dizziness - Sore Throat   - Cramping   - Tremor - Blurred Vision   - Leg Swelling   - Sensory Change - Double Vision   - Dizzy when standing   - Speech Change - Eye Redness       - Focal Weakness - Dry Eyes       - Loss of Consciousness          Endocrine  Musculoskeletal  Psychiatric   - Cold intolerance - Muscle Pain - Depression   - Heat intolerance - Neck Pain - Insomnia   - Anemia - Joint Pain - Memory Loss   -  Easy bruising, bleeding - Heel pain - Anxiety      Toe Pain        Leg/Ankle/Foot Pain         Objective:     Ht 5' 9" (1.753 m)   Wt 69 " "kg (152 lb 1.9 oz)   BMI 22.46 kg/m²   Vitals:    12/14/23 1310   Weight: 69 kg (152 lb 1.9 oz)   Height: 5' 9" (1.753 m)   PainSc: 0-No pain       Physical Exam    General Appearance:   Patient appears well developed, well nourished  Patient appears stated age    Psychiatric:   Patient is oriented to time, place, and person.  Patient has appropriate mood and affect    Neck:  Trachea Midline  No visible masses    Respiratory/Ears:  No distress or labored breathing.  Able to differentiate between normal talking voice and whisper.  Able to follow commands    Eyes:  Visual Acuity intact  Lids and conjunctivae normal. No discoloration noted.    Foot Exam  Physical Exam  Ortho Exam  Ortho/SPM Exam  Foot/Ankle Musculoskeletal Exam    R LE exam con't:  V:  DP 2/4, PT 2/4   CRT< 3s to all digits tested   Tibial and popliteal lymph nodes are w/o abnormality    Edema: absent, varicosities: absent    N:  Patient displays normal ankle reflexes   SILT in SP/DP/T/Yandel/Saph distributions    Ortho: +Motor EHL/FHL/TA/GA   observed mild lateral deviation of the hallux with bunion deformity present R and +midfoot instability noted    Enlarged bony prominence present at the dorsal aspect of the 1st metatarsal head R foot   +soft tissue mass medial plantar foot at heel: firm, well circumscribed   There is mild decreased ROM at the first MTP joints with pain and w/o crepitus.   There is moderate pain with palpation of dorsal bump of the R 1st MPJ  Compartments soft/compressible. No pain on passive stretch of big toe. No calf  Pain.    Derm:  skin intact, skin warm and dry, skin without ulcers or lesions, skin without induration, nails normal      Imaging / Labs:      MRI Foot (Hindfoot) Right W W/O Contrast    Result Date: 12/5/2023  EXAMINATION: MRI FOOT (HINDFOOT) RIGHT W W/O CONTRAST CLINICAL HISTORY: Soft tissue mass, foot, deep;  Other specified soft tissue disorders TECHNIQUE: Pre and postcontrast enhanced examination of the right " hindfoot before and after administration of 6 cc Gadavist. Limitations: There is significant artifact on several sequences related to patient motion.  Also, the MRI technologist did not marked the area of clinical concern as requested by the clinician. COMPARISON: X-ray 11/14/2023 FINDINGS: There is a chronic appearing tear of the ATFL without evidence of osseous or soft tissue impingement.  The remainder of the lateral ankle ligaments appear intact.  The deep and superficial deltoid ligament appears intact. The Achilles, peroneal, and flexor tendons are intact.  Extensor tendons intact. There is a prominent subtalar joint effusion with fluid projecting posteriorly and anteriorly, partially extending into the sinus tarsi. There is focal fusiform thickening central cord of the plantar fascia approximately 15 mm distal to its origin on the calcaneus.  There is some focal disruption of the fascia and enhancement at the lateral aspect over a distance of approximately 13 x 7 mm compatible with a partial tear.  The lateral cord appears intact. Visualized midfoot soft tissue structures appear intact.     Partial tear of the lateral aspect of the central cord of the plantar fascia. Chronic ATFL tear without signs of impingement. Electronically signed by: Pasquale Ruggiero Jr Date:    12/05/2023 Time:    10:36    X-Ray Foot Complete Right    Result Date: 11/14/2023  EXAMINATION: XR FOOT COMPLETE 3 VIEW RIGHT CLINICAL HISTORY: . Pain in right foot TECHNIQUE: AP, lateral, and oblique views of the right foot were performed. COMPARISON: 10/18/2022 FINDINGS: No acute abnormality or significant change.  Similar arthritic findings of the 5th digit.  Dorsal calcaneal enthesophyte changes stable.     As above Electronically signed by: Hector Pagan MD Date:    11/14/2023 Time:    15:27        Note: This was dictated using a computer transcription program. Although proofread, it may contain computer transcription errors and  phonetic errors. Other human proofreading errors may also exist. Corrections may be performed at a later time. Please contact us for any clarification if needed.    John Hercules DPM  [facility]

## 2023-12-14 NOTE — PATIENT INSTRUCTIONS
Heel Pain (Plantar Fasciitis)        Heel pain is most often caused by plantar fasciitis, a condition that is sometimes also called heel spur syndrome when a spur is present. Heel pain may also be due to other causes, such as a stress fracture, tendonitis, arthritis, nerve irritation or, rarely, a cyst.    Because there are several potential causes, it is important to have heel pain properly diagnosed. A foot and ankle surgeon is able to distinguish between all the possibilities and to determine the underlying source of your heel pain.    What Is Plantar Fasciitis?  Heel pain is often caused by plantar fasciitis  Plantar fasciitis is an inflammation of the band of tissue (the plantar fascia) that extends from the heel to the toes. In this condition, the fascia first becomes irritated and then inflamed, resulting in heel pain.    Causes  The most common cause of plantar fasciitis relates to faulty structure of the foot. For example, people who have problems with their arches, either overly flat feet or high-arched feet, are more prone to developing plantar fasciitis.    Wearing nonsupportive footwear on hard, flat surfaces puts abnormal strain on the plantar fascia and can also lead to plantar fasciitis. This is particularly evident when ones job requires long hours on the feet. Obesity and overuse may also contribute to plantar fasciitis.    Symptoms  The symptoms of plantar fasciitis are:    Pain on the bottom of the heel  Pain in the arch of the foot  Pain that is usually worse upon arising  Pain that increases over a period of months  Swelling on the bottom of the heel     People with plantar fasciitis often describe the pain as worse when they get up in the morning or after they have been sitting for long periods of time. After a few minutes of walking, the pain decreases because walking stretches the fascia. For some people, the pain subsides but returns after spending long periods of time on their  feet.    Diagnosis  To arrive at a diagnosis, the foot and ankle surgeon will obtain your medical history and examine your foot. Throughout this process, the surgeon rules out all possible causes for your heel pain other than plantar fasciitis.    In addition, diagnostic imaging studies, such as x-rays or other imaging modalities, may be used to distinguish the different types of heel pain. Sometimes heel spurs are found in patients with plantar fasciitis, but these are rarely a source of pain. When they are present, the condition may be diagnosed as plantar fasciitis/heel spur syndrome.    Nonsurgical Treatment  Treatment of plantar fasciitis begins with first-line strategies, which you can begin at home:    -Stretching exercises. Exercises that stretch out the calf muscles help ease pain and assist with recovery.  -Avoid going barefoot. When you walk without shoes, you put undue strain and stress on your plantar fascia.  -Ice. Putting an ice pack on your heel for 20 minutes several times a day helps reduce inflammation. Place a thin towel between the ice and your heel; do not apply ice directly to the skin.  -Limit activities. Cut down on extended physical activities to give your heel a rest.  -Shoe modifications. Wearing supportive shoes that have good arch support and a slightly raised heel reduces stress on the plantar fascia.  -Medications. Oral nonsteroidal anti-inflammatory drugs (NSAIDs), such as ibuprofen, may be recommended to reduce pain and inflammation.     If you still have pain after several weeks, see your foot and ankle surgeon, who may add one or more of these treatment approaches:    -Padding, taping and strapping. Placing pads in the shoe softens the impact of walking. Taping and strapping help support the foot and reduce strain on the fascia.  -Orthotic devices. Custom orthotic devices that fit into your shoe help correct the underlying structural abnormalities causing the plantar  fasciitis.  -Injection therapy. In some cases, corticosteroid injections are used to help reduce the inflammation and relieve pain.  -Removable walking cast. A removable walking cast may be used to keep your foot immobile for a few weeks to allow it to rest and heal.  -Night splint. Wearing a night splint allows you to maintain an extended stretch of the plantar fascia while sleeping. This may help reduce the morning pain experienced by some patients.  -Physical therapy. Exercises and other physical therapy measures may be used to help provide relief.     When Is Surgery Needed?  Although most patients with plantar fasciitis respond to nonsurgical treatment, a small percentage of patients may require surgery. If, after several months of nonsurgical treatment, you continue to have heel pain, surgery will be considered. Your foot and ankle surgeon will discuss the surgical options with you and determine which approach would be most beneficial for you.    Long-Term Care  No matter what kind of treatment you undergo for plantar fasciitis, the underlying causes that led to this condition may remain. Therefore, you will need to continue with preventive measures. Wearing supportive shoes, stretching and using custom orthotic devices are the mainstay of long-term treatment for plantar fasciitis.            Understanding Heel Pain  Your heel is the back part of your foot. A band of tissue called the plantar fascia connects the heel bone to the bones in the ball of your foot. Nerves run from the heel up the inside of your ankle and into your leg. When you feel pain in the bottom of your heel, the plantar fascia may be inflamed. Overuse, Achilles tightness, or excess body weight can cause the tissue to tear or pull away from the bone. Sometimes the inflamed plantar fascia also irritates a nerve, causing more pain.    What causes heel pain?  Wearing shoes with poor cushioning can irritate the tissue in your heel (plantar  fascia). Being overweight or standing for long periods can also irritate the tissue. Running, walking, tennis, and other sports that put stress on the heels can cause tiny tears in the tissue. If your lower leg muscles are tight, this is more likely to occur. A tight Achilles tendon will also contribute to heel pain.  Symptoms  You may feel pain on the bottom or on the inside edge of your heel. The pain may be sharp when you get out of bed or when you stand up after sitting for a while. You may feel a dull ache in your heel after youve been standing for a long time on a hard surface. Running can also cause a dull ache.  Preventing future problems  To prevent future heel pain, wear shoes with well-cushioned heels. And do exercises prescribed by your healthcare provider to stretch the plantar fascia and the muscles in the lower leg.   Date Last Reviewed: 9/10/2015  © 8855-5082 [employer]. [address] [place] [address]. All rights reserved. This information is not intended as a substitute for professional medical care. Always follow your healthcare professional's instructions.      Treating Plantar Fasciitis  First, your healthcare provider tries to determine the cause of your problem in order to suggest ways to relieve pain. If your pain is due to poor foot mechanics, custom-made shoe inserts (orthoses) may help.    Reduce symptoms  To relieve mild symptoms, try aspirin, ibuprofen, or other medicines as directed. Rubbing ice on the affected area may also help.  To reduce severe pain and swelling, your healthcare provider may prescribe pills or injections or a walking cast in some instances. Physical therapy, such as ultrasound or a daily stretching program, may also be recommended. Surgery is rarely required.  To reduce symptoms caused by poor foot mechanics, your foot may be taped. This supports the arch and temporarily controls movement. Night splints may also help by stretching the  fascia.  Control movement  If taping helps, your healthcare provider may prescribe orthoses. Built from plaster casts of your feet, these inserts control the way your foot moves. As a result, your symptoms should go away.  Reduce overuse  Every time your foot strikes the ground, the plantar fascia is stretched. You can reduce the strain on the plantar fascia and the possibility of overuse by following these suggestions:  Lose any excess weight.  Avoid running on hard or uneven ground.  Use orthoses at all times in your shoes and house slippers.  If surgery is needed  Your healthcare provider may consider surgery if other types of treatment don't control your pain. During surgery, the plantar fascia is partially cut to release tension. As you heal, fibrous tissue fills the space between the heel bone and the plantar fascia.   Date Last Reviewed: 10/14/2015  © 2881-2206 [employer]. [address] [place] [address]. All rights reserved. This information is not intended as a substitute for professional medical care. Always follow your healthcare professional's instructions.      Equinus          What Is Equinus?    Equinus is a condition in which the upward bending motion of the ankle joint is limited. Someone with equinus lacks the flexibility to bring the top of the foot toward the front of the leg. Equinus can occur in one or both feet. When it involves both feet, the limitation of motion is sometimes worse in one foot than in the other.    People with equinus develop ways to compensate for their limited ankle motion, and this often leads to other foot, leg or back problems. The most common methods of compensation are flattening of the arch or picking up the heel early when walking, placing increased pressure on the ball of the foot. Other patients compensate by toe walking, while a smaller number take steps by bending abnormally at the hip or knee.    Causes  There are several possible causes  for the limited range of ankle motion. Often, it is due to tightness in the Achilles tendon or calf muscles (the soleus muscle and/or gastrocnemius muscle). In some patients, this tightness is congenital (present at birth), and sometimes it is an inherited trait. Other patients acquire the tightness from being in a cast, being on crutches or frequently wearing high-heeled shoes. In addition, diabetes can affect the fibers of the Achilles tendon and cause tightness. Sometimes equinus is related to a bone blocking the ankle motion. For example, a fragment of a broken bone following an ankle injury, or bone block, can get in the way and restrict motion. Equinus may also result from one leg being shorter than the other. Less often, equinus is caused by spasms in the calf muscle. These spasms may be signs of an underlying neurologic disorder.      Foot Problems Related to Equinus  Depending on how a patient compensates for the inability to bend properly at the ankle, a variety of foot conditions can develop, including:    Plantar fasciitis (arch/heel pain)  Calf cramping  Tendonitis (inflammation in the Achilles tendon)  Metatarsalgia (pain and/or callusing on the ball of the foot)  Flatfoot  Arthritis of the midfoot (middle area of the foot)  Pressure sores on the ball of the foot or the arch  Bunions and hammertoes  Ankle pain  Shin splints     Diagnosis  Most patients with equinus are unaware they have this condition when they first visit the doctor. Instead, they come to the doctor seeking relief for foot problems associated with equinus.    To diagnose equinus, the foot and ankle surgeon will evaluate the ankle's range of motion when the knee is flexed (bent) as well as extended (straightened). This enables the surgeon to identify whether the tendon or muscle is tight and to assess whether bone is interfering with ankle motion. X-rays may also be ordered. In some cases, the foot and ankle surgeon may refer the  patient for neurologic evaluation.    Nonsurgical Treatment  Treatment includes strategies aimed at relieving the symptoms and conditions associated with equinus. In addition, the patient is treated for the equinus itself through one or more of the following options:    Night splint. The foot may be placed in a splint at night to keep it in a position that helps reduce tightness of the calf muscle.  Heel lifts. Placing heel lifts inside the shoes or wearing shoes with a moderate heel takes stress off the Achilles tendon when walking and may reduce symptoms.  Arch supports or orthotic devices. Custom orthotic devices that fit into the shoe are often prescribed to keep weight distributed properly and to help control muscle/tendon imbalance.  Physical therapy. To help remedy muscle tightness, exercises that stretch the calf muscle(s) are recommended.     When Is Surgery Needed?  In some cases, surgery may be needed to correct the cause of equinus if it is related to a tight tendon or a bone blocking the ankle motion. The foot and ankle surgeon will determine the type of procedure that is best suited to the individual patient.                Ankle Dorsiflexion/Plantarflexion (Flexibility)    Sit on the floor or in bed with your legs straight in front of you.  Point both feet. Then flex both feet.  Do this 10 to 30 times in a row.  Repeat this exercise 2 times a day, or as instructed.  Date Last Reviewed: 5/1/2016 © 2000-2016 [employer]. [address] [place] [address]. All rights reserved. This information is not intended as a substitute for professional medical care. Always follow your healthcare professional's instructions.          Arch retraining    These exercises are for your right foot. Switch sides for your left foot.  Sit in a chair or stand with both feet flat on the floor. Press down with the ball of your right foot, but only on the left side of the foot, just under the big  toe.  Then pull the bottom of your big toe back toward your heel. This should pull up the arch of your foot. Dont flex your toes while doing this. It is a subtle movement of the arch.  Hold for 5 seconds. Relax.  Date Last Reviewed: 3/10/2016  © 5922-6082 [employer]. [address] [place] [address]. All rights reserved. This information is not intended as a substitute for professional medical care. Always follow your healthcare professional's instructions.        Soleus Stretch (Flexibility)    Stand facing a wall from 3 feet away. Take one step toward the wall with your right foot.  Place both palms on the wall. Bend both knees and lean forward. Keep both heels on the floor.  Hold for 30 to 60 seconds. Then relax both legs. Repeat the exercise 2 times.  Switch legs and repeat.  Repeat this exercise 3 times a day, or as instructed.     Tip: Dont bounce while youre stretching.   Date Last Reviewed: 3/10/2016  © 5239-1978 [employer]. [address] [place] [address]. All rights reserved. This information is not intended as a substitute for professional medical care. Always follow your healthcare professional's instructions.          Recommended OTC orthotics:  -powerstep  -superfeet    Recommended shoegear:  -new balance  -ascics  -tushar gardner

## 2024-01-11 ENCOUNTER — OFFICE VISIT (OUTPATIENT)
Dept: ORTHOPEDICS | Facility: CLINIC | Age: 54
End: 2024-01-11
Payer: COMMERCIAL

## 2024-01-11 VITALS — WEIGHT: 152 LBS | HEIGHT: 69 IN | BODY MASS INDEX: 22.51 KG/M2

## 2024-01-11 DIAGNOSIS — M65.4 DE QUERVAIN'S TENOSYNOVITIS, BILATERAL: Primary | ICD-10-CM

## 2024-01-11 PROCEDURE — 20550 NJX 1 TENDON SHEATH/LIGAMENT: CPT | Mod: 50,S$GLB,, | Performed by: ORTHOPAEDIC SURGERY

## 2024-01-11 PROCEDURE — 20550 NJX 1 TENDON SHEATH/LIGAMENT: CPT | Mod: 50,PBBFAC,PN | Performed by: ORTHOPAEDIC SURGERY

## 2024-01-11 PROCEDURE — 99213 OFFICE O/P EST LOW 20 MIN: CPT | Mod: PBBFAC,PN,25 | Performed by: ORTHOPAEDIC SURGERY

## 2024-01-11 PROCEDURE — 99999 PR PBB SHADOW E&M-EST. PATIENT-LVL III: CPT | Mod: PBBFAC,,, | Performed by: ORTHOPAEDIC SURGERY

## 2024-01-11 PROCEDURE — 99213 OFFICE O/P EST LOW 20 MIN: CPT | Mod: 25,S$GLB,, | Performed by: ORTHOPAEDIC SURGERY

## 2024-01-11 RX ORDER — TRIAMCINOLONE ACETONIDE 40 MG/ML
40 INJECTION, SUSPENSION INTRA-ARTICULAR; INTRAMUSCULAR
Status: COMPLETED | OUTPATIENT
Start: 2024-01-11 | End: 2024-01-11

## 2024-01-11 RX ADMIN — TRIAMCINOLONE ACETONIDE 40 MG: 40 INJECTION, SUSPENSION INTRA-ARTICULAR; INTRAMUSCULAR at 11:01

## 2024-01-11 NOTE — PROGRESS NOTES
"Subjective:      Patient ID: Fawn Waters is a 53 y.o. female.  Chief Complaint: Follow-up and Pain of the Right Hand (PT STATES PAIN IS MORE SERVE IN RT HAND ) and Follow-up and Pain of the Left Hand      HPI  Fawn Waters is a  53 y.o. female presenting today for follow up of bilateral wrist pain related to de Quervain tendinitis.  She reports that she is having a flare-up in both hands having pain at the base of each thumb   No numbness or tingling reported but she does have a history of carpal tunnel syndrome as well  She is done well with injections in the past would like these repeated.    Review of patient's allergies indicates:   Allergen Reactions    Codeine      Pt states she is in recovery and cannot take any narcotic medications         Current Outpatient Medications   Medication Sig Dispense Refill    cetirizine (ZYRTEC) 10 MG tablet       fluoxetine (PROZAC) 20 MG capsule       hydrOXYzine HCL (ATARAX) 10 MG Tab Take 10 mg by mouth every 8 (eight) hours as needed.      omeprazole (PRILOSEC) 20 MG capsule       meloxicam (MOBIC) 7.5 MG tablet Take 1 tablet (7.5 mg total) by mouth once daily. 30 tablet 0     No current facility-administered medications for this visit.       Past Medical History:   Diagnosis Date    Asthma     Depression     Urinary tract infection     Vaginal infection        Past Surgical History:   Procedure Laterality Date    TUBAL LIGATION         OBJECTIVE:   PHYSICAL EXAM:  Height: 5' 9" (175.3 cm) Weight: 68.9 kg (152 lb)  Vitals:    01/11/24 1105   Weight: 68.9 kg (152 lb)   Height: 5' 9" (1.753 m)   PainSc:   8     Ortho/SPM Exam  Examination hands there is some tenderness over the radial styloid of each wrist   Slight swelling  Positive Finkelstein test  Tinel sign negative       RADIOGRAPHS:  None  Comments: I have personally reviewed the imaging and I agree with the above radiologist's report.    ASSESSMENT/PLAN:     IMPRESSION:  Bilateral de Quervain tendinitis of " the wrists    PLAN:  Patient would like injection today  After pause for time-out identified each wrist injected 1st dorsal compartment with combination Kenalog 20 mg 0.5 cc xylocaine sterile technique  Tolerated the procedure well without complication  Continue wrist splints for lifting activities   Consider surgery if symptoms persist    FOLLOW UP:  2-3 months    Disclaimer: This note has been generated using voice-recognition software. There may be typographical errors that have been missed during proof-reading.

## 2024-07-25 ENCOUNTER — OFFICE VISIT (OUTPATIENT)
Dept: ORTHOPEDICS | Facility: CLINIC | Age: 54
End: 2024-07-25
Payer: COMMERCIAL

## 2024-07-25 VITALS — HEIGHT: 69 IN | BODY MASS INDEX: 22.45 KG/M2

## 2024-07-25 DIAGNOSIS — M65.4 DE QUERVAIN'S TENOSYNOVITIS, BILATERAL: Primary | ICD-10-CM

## 2024-07-25 PROCEDURE — 99999 PR PBB SHADOW E&M-EST. PATIENT-LVL II: CPT | Mod: PBBFAC,,, | Performed by: ORTHOPAEDIC SURGERY

## 2024-07-25 RX ORDER — TRIAMCINOLONE ACETONIDE 40 MG/ML
40 INJECTION, SUSPENSION INTRA-ARTICULAR; INTRAMUSCULAR
Status: COMPLETED | OUTPATIENT
Start: 2024-07-25 | End: 2024-07-25

## 2024-07-25 RX ADMIN — TRIAMCINOLONE ACETONIDE 40 MG: 40 INJECTION, SUSPENSION INTRA-ARTICULAR; INTRAMUSCULAR at 01:07

## 2024-07-25 NOTE — PROGRESS NOTES
"Subjective:      Patient ID: Fawn Waters is a 54 y.o. female.  Chief Complaint: Hand Pain (Bilateral hand pain, numbness)      HPI  Fawn Waters is a  54 y.o. female presenting today for follow up of bilateral wrist pain related to de Quervain tendinitis.  She reports that she is having a flare-up in both wrist   Symptoms worse with use   No numbness or tingling reported.    Review of patient's allergies indicates:   Allergen Reactions    Codeine      Pt states she is in recovery and cannot take any narcotic medications         Current Outpatient Medications   Medication Sig Dispense Refill    cetirizine (ZYRTEC) 10 MG tablet       fluoxetine (PROZAC) 20 MG capsule       omeprazole (PRILOSEC) 20 MG capsule        No current facility-administered medications for this visit.       Past Medical History:   Diagnosis Date    Asthma     Depression     Urinary tract infection     Vaginal infection        Past Surgical History:   Procedure Laterality Date    TUBAL LIGATION         OBJECTIVE:   PHYSICAL EXAM:  Height: 5' 9" (175.3 cm)    Vitals:    07/25/24 1309   Height: 5' 9" (1.753 m)   PainSc: 10-Worst pain ever   PainLoc: Hand     Ortho/SPM Exam  Examination hands there is tenderness over the 1st dorsal compartment of each wrist   Mild swelling   Positive Finkelstein test   Tinel sign negative   Neurologic exam intact       RADIOGRAPHS:  None  Comments: I have personally reviewed the imaging and I agree with the above radiologist's report.    ASSESSMENT/PLAN:     IMPRESSION:  De Quervain tendinitis bilateral wrists    PLAN:  We discussed options including injection versus surgery   She would like injection both wrist   After pause for time-out identified each wrist injected 1st dorsal compartment bilateral with combination Kenalog 20 mg 0.5 cc xylocaine sterile technique  Tolerated the procedure well without complication   Continue wrist splints during the day Advil or Motrin as well  Consider surgery if " symptoms persist    FOLLOW UP:  2-3 months    Disclaimer: This note has been generated using voice-recognition software. There may be typographical errors that have been missed during proof-reading.

## 2025-02-13 DIAGNOSIS — N95.1 MENOPAUSAL SYMPTOMS: Primary | ICD-10-CM

## 2025-02-13 DIAGNOSIS — N94.10 DYSPAREUNIA IN FEMALE: ICD-10-CM
